# Patient Record
Sex: FEMALE | Race: WHITE | NOT HISPANIC OR LATINO | Employment: OTHER | URBAN - METROPOLITAN AREA
[De-identification: names, ages, dates, MRNs, and addresses within clinical notes are randomized per-mention and may not be internally consistent; named-entity substitution may affect disease eponyms.]

---

## 2023-01-01 ENCOUNTER — APPOINTMENT (OUTPATIENT)
Dept: GENERAL RADIOLOGY | Facility: CLINIC | Age: 63
DRG: 948 | End: 2023-01-01
Attending: EMERGENCY MEDICINE
Payer: COMMERCIAL

## 2023-01-01 ENCOUNTER — APPOINTMENT (OUTPATIENT)
Dept: ULTRASOUND IMAGING | Facility: CLINIC | Age: 63
DRG: 948 | End: 2023-01-01
Attending: INTERNAL MEDICINE
Payer: COMMERCIAL

## 2023-01-01 ENCOUNTER — MEDICAL CORRESPONDENCE (OUTPATIENT)
Dept: HEALTH INFORMATION MANAGEMENT | Facility: CLINIC | Age: 63
End: 2023-01-01

## 2023-01-01 ENCOUNTER — APPOINTMENT (OUTPATIENT)
Dept: CT IMAGING | Facility: CLINIC | Age: 63
DRG: 948 | End: 2023-01-01
Attending: EMERGENCY MEDICINE
Payer: COMMERCIAL

## 2023-01-01 ENCOUNTER — APPOINTMENT (OUTPATIENT)
Dept: INTERVENTIONAL RADIOLOGY/VASCULAR | Facility: CLINIC | Age: 63
DRG: 948 | End: 2023-01-01
Attending: PHYSICIAN ASSISTANT
Payer: COMMERCIAL

## 2023-01-01 ENCOUNTER — APPOINTMENT (OUTPATIENT)
Dept: CT IMAGING | Facility: CLINIC | Age: 63
DRG: 948 | End: 2023-01-01
Attending: HOSPITALIST
Payer: COMMERCIAL

## 2023-01-01 ENCOUNTER — APPOINTMENT (OUTPATIENT)
Dept: CT IMAGING | Facility: CLINIC | Age: 63
End: 2023-01-01
Attending: EMERGENCY MEDICINE
Payer: COMMERCIAL

## 2023-01-01 ENCOUNTER — HOSPITAL ENCOUNTER (EMERGENCY)
Facility: CLINIC | Age: 63
Discharge: HOME | End: 2023-08-08
Attending: EMERGENCY MEDICINE | Admitting: EMERGENCY MEDICINE
Payer: COMMERCIAL

## 2023-01-01 ENCOUNTER — APPOINTMENT (OUTPATIENT)
Dept: GENERAL RADIOLOGY | Facility: CLINIC | Age: 63
End: 2023-01-01
Attending: EMERGENCY MEDICINE
Payer: COMMERCIAL

## 2023-01-01 ENCOUNTER — HOSPITAL ENCOUNTER (INPATIENT)
Facility: CLINIC | Age: 63
LOS: 7 days | Discharge: HOSPICE | DRG: 948 | End: 2023-09-29
Attending: EMERGENCY MEDICINE | Admitting: INTERNAL MEDICINE
Payer: COMMERCIAL

## 2023-01-01 ENCOUNTER — TRANSFERRED RECORDS (OUTPATIENT)
Dept: HEALTH INFORMATION MANAGEMENT | Facility: CLINIC | Age: 63
End: 2023-01-01
Payer: COMMERCIAL

## 2023-01-01 ENCOUNTER — APPOINTMENT (OUTPATIENT)
Dept: GENERAL RADIOLOGY | Facility: CLINIC | Age: 63
DRG: 948 | End: 2023-01-01
Attending: INTERNAL MEDICINE
Payer: COMMERCIAL

## 2023-01-01 VITALS
HEART RATE: 67 BPM | TEMPERATURE: 97.8 F | OXYGEN SATURATION: 96 % | WEIGHT: 200 LBS | HEIGHT: 65 IN | RESPIRATION RATE: 16 BRPM | BODY MASS INDEX: 33.32 KG/M2 | DIASTOLIC BLOOD PRESSURE: 86 MMHG | SYSTOLIC BLOOD PRESSURE: 170 MMHG

## 2023-01-01 VITALS
OXYGEN SATURATION: 95 % | HEIGHT: 66 IN | HEART RATE: 79 BPM | DIASTOLIC BLOOD PRESSURE: 79 MMHG | RESPIRATION RATE: 16 BRPM | BODY MASS INDEX: 36.42 KG/M2 | SYSTOLIC BLOOD PRESSURE: 154 MMHG | TEMPERATURE: 98.4 F | WEIGHT: 226.6 LBS

## 2023-01-01 DIAGNOSIS — S22.32XA CLOSED FRACTURE OF ONE RIB OF LEFT SIDE, INITIAL ENCOUNTER: ICD-10-CM

## 2023-01-01 DIAGNOSIS — R11.0 NAUSEA: ICD-10-CM

## 2023-01-01 DIAGNOSIS — K59.00 CONSTIPATION, UNSPECIFIED CONSTIPATION TYPE: ICD-10-CM

## 2023-01-01 DIAGNOSIS — Z79.1 NSAID LONG-TERM USE: ICD-10-CM

## 2023-01-01 DIAGNOSIS — G89.3 CANCER ASSOCIATED PAIN: Primary | ICD-10-CM

## 2023-01-01 DIAGNOSIS — R05.9 COUGH, UNSPECIFIED TYPE: ICD-10-CM

## 2023-01-01 DIAGNOSIS — M89.9 LYTIC LESION OF BONE ON X-RAY: ICD-10-CM

## 2023-01-01 DIAGNOSIS — R52 INTRACTABLE PAIN: ICD-10-CM

## 2023-01-01 DIAGNOSIS — J90 PLEURAL EFFUSION, LEFT: ICD-10-CM

## 2023-01-01 DIAGNOSIS — F19.20 DEPENDENCY ON PAIN MEDICATION (H): ICD-10-CM

## 2023-01-01 DIAGNOSIS — R22.2 CHEST WALL MASS: ICD-10-CM

## 2023-01-01 LAB
ALBUMIN SERPL BCG-MCNC: 4.2 G/DL (ref 3.5–5.2)
ALP SERPL-CCNC: 132 U/L (ref 35–104)
ALT SERPL W P-5'-P-CCNC: 11 U/L (ref 0–50)
ANION GAP SERPL CALCULATED.3IONS-SCNC: 11 MMOL/L (ref 7–15)
ANION GAP SERPL CALCULATED.3IONS-SCNC: 11 MMOL/L (ref 7–15)
ANION GAP SERPL CALCULATED.3IONS-SCNC: 14 MMOL/L (ref 7–15)
ANION GAP SERPL CALCULATED.3IONS-SCNC: 15 MMOL/L (ref 7–15)
ANION GAP SERPL CALCULATED.3IONS-SCNC: 8 MMOL/L (ref 7–15)
AST SERPL W P-5'-P-CCNC: 15 U/L (ref 0–45)
ATRIAL RATE - MUSE: 74 BPM
ATRIAL RATE - MUSE: 90 BPM
BACTERIA BLD CULT: NO GROWTH
BACTERIA BLD CULT: NO GROWTH
BACTERIA PLR CULT: NO GROWTH
BASOPHILS # BLD AUTO: 0.1 10E3/UL (ref 0–0.2)
BASOPHILS # BLD AUTO: 0.1 10E3/UL (ref 0–0.2)
BASOPHILS NFR BLD AUTO: 0 %
BASOPHILS NFR BLD AUTO: 1 %
BILIRUB SERPL-MCNC: 0.3 MG/DL
BUN SERPL-MCNC: 12.2 MG/DL (ref 8–23)
BUN SERPL-MCNC: 14.2 MG/DL (ref 8–23)
BUN SERPL-MCNC: 14.6 MG/DL (ref 8–23)
BUN SERPL-MCNC: 16.1 MG/DL (ref 8–23)
BUN SERPL-MCNC: 17.2 MG/DL (ref 8–23)
CALCIUM SERPL-MCNC: 9.2 MG/DL (ref 8.8–10.2)
CALCIUM SERPL-MCNC: 9.3 MG/DL (ref 8.8–10.2)
CALCIUM SERPL-MCNC: 9.5 MG/DL (ref 8.8–10.2)
CALCIUM SERPL-MCNC: 9.5 MG/DL (ref 8.8–10.2)
CALCIUM SERPL-MCNC: 9.9 MG/DL (ref 8.8–10.2)
CHLORIDE SERPL-SCNC: 100 MMOL/L (ref 98–107)
CHLORIDE SERPL-SCNC: 96 MMOL/L (ref 98–107)
CHLORIDE SERPL-SCNC: 97 MMOL/L (ref 98–107)
CHLORIDE SERPL-SCNC: 99 MMOL/L (ref 98–107)
CHLORIDE SERPL-SCNC: 99 MMOL/L (ref 98–107)
CREAT SERPL-MCNC: 0.62 MG/DL (ref 0.51–0.95)
CREAT SERPL-MCNC: 0.65 MG/DL (ref 0.51–0.95)
CREAT SERPL-MCNC: 0.66 MG/DL (ref 0.51–0.95)
CREAT SERPL-MCNC: 0.69 MG/DL (ref 0.51–0.95)
CREAT SERPL-MCNC: 0.71 MG/DL (ref 0.51–0.95)
CREAT SERPL-MCNC: 0.77 MG/DL (ref 0.51–0.95)
CREAT SERPL-MCNC: 0.92 MG/DL (ref 0.51–0.95)
D DIMER PPP FEU-MCNC: 1.66 UG/ML FEU (ref 0–0.5)
DEPRECATED HCO3 PLAS-SCNC: 24 MMOL/L (ref 22–29)
DEPRECATED HCO3 PLAS-SCNC: 28 MMOL/L (ref 22–29)
DEPRECATED HCO3 PLAS-SCNC: 29 MMOL/L (ref 22–29)
DEPRECATED HCO3 PLAS-SCNC: 30 MMOL/L (ref 22–29)
DEPRECATED HCO3 PLAS-SCNC: 31 MMOL/L (ref 22–29)
DIASTOLIC BLOOD PRESSURE - MUSE: NORMAL MMHG
DIASTOLIC BLOOD PRESSURE - MUSE: NORMAL MMHG
EGFRCR SERPLBLD CKD-EPI 2021: 85 ML/MIN/1.73M2
EGFRCR SERPLBLD CKD-EPI 2021: >90 ML/MIN/1.73M2
EOSINOPHIL # BLD AUTO: 0 10E3/UL (ref 0–0.7)
EOSINOPHIL # BLD AUTO: 0.3 10E3/UL (ref 0–0.7)
EOSINOPHIL NFR BLD AUTO: 0 %
EOSINOPHIL NFR BLD AUTO: 2 %
ERYTHROCYTE [DISTWIDTH] IN BLOOD BY AUTOMATED COUNT: 13.2 % (ref 10–15)
ERYTHROCYTE [DISTWIDTH] IN BLOOD BY AUTOMATED COUNT: 13.3 % (ref 10–15)
ERYTHROCYTE [DISTWIDTH] IN BLOOD BY AUTOMATED COUNT: 13.7 % (ref 10–15)
GFR SERPL CREATININE-BSD FRML MDRD: 69 ML/MIN/1.73M2
GLUCOSE BODY FLUID SOURCE: NORMAL
GLUCOSE FLD-MCNC: 121 MG/DL
GLUCOSE SERPL-MCNC: 105 MG/DL (ref 70–99)
GLUCOSE SERPL-MCNC: 112 MG/DL (ref 70–99)
GLUCOSE SERPL-MCNC: 116 MG/DL (ref 70–99)
GLUCOSE SERPL-MCNC: 117 MG/DL (ref 70–99)
GLUCOSE SERPL-MCNC: 143 MG/DL (ref 70–99)
GRAM STAIN RESULT: NORMAL
GRAM STAIN RESULT: NORMAL
HCT VFR BLD AUTO: 36.8 % (ref 35–47)
HCT VFR BLD AUTO: 38.2 % (ref 35–47)
HCT VFR BLD AUTO: 39.5 % (ref 35–47)
HCT VFR BLD AUTO: 43 % (ref 35–47)
HCT VFR BLD AUTO: 44.4 % (ref 35–47)
HGB BLD-MCNC: 11.5 G/DL (ref 11.7–15.7)
HGB BLD-MCNC: 11.8 G/DL (ref 11.7–15.7)
HGB BLD-MCNC: 12.1 G/DL (ref 11.7–15.7)
HGB BLD-MCNC: 13.7 G/DL (ref 11.7–15.7)
HGB BLD-MCNC: 14.5 G/DL (ref 11.7–15.7)
HOLD SPECIMEN: NORMAL
HOLD SPECIMEN: NORMAL
IMM GRANULOCYTES # BLD: 0.1 10E3/UL
IMM GRANULOCYTES # BLD: 0.1 10E3/UL
IMM GRANULOCYTES NFR BLD: 0 %
IMM GRANULOCYTES NFR BLD: 1 %
INTERPRETATION ECG - MUSE: NORMAL
INTERPRETATION ECG - MUSE: NORMAL
LD BODY BODY FLUID SOURCE: NORMAL
LDH FLD L TO P-CCNC: 222 U/L
LDH SERPL L TO P-CCNC: 220 U/L (ref 0–250)
LYMPHOCYTES # BLD AUTO: 2.5 10E3/UL (ref 0.8–5.3)
LYMPHOCYTES # BLD AUTO: 3.7 10E3/UL (ref 0.8–5.3)
LYMPHOCYTES NFR BLD AUTO: 13 %
LYMPHOCYTES NFR BLD AUTO: 26 %
MCH RBC QN AUTO: 28.5 PG (ref 26.5–33)
MCH RBC QN AUTO: 28.5 PG (ref 26.5–33)
MCH RBC QN AUTO: 28.8 PG (ref 26.5–33)
MCH RBC QN AUTO: 28.9 PG (ref 26.5–33)
MCH RBC QN AUTO: 30 PG (ref 26.5–33)
MCHC RBC AUTO-ENTMCNC: 30.6 G/DL (ref 31.5–36.5)
MCHC RBC AUTO-ENTMCNC: 30.9 G/DL (ref 31.5–36.5)
MCHC RBC AUTO-ENTMCNC: 31.3 G/DL (ref 31.5–36.5)
MCHC RBC AUTO-ENTMCNC: 31.9 G/DL (ref 31.5–36.5)
MCHC RBC AUTO-ENTMCNC: 32.7 G/DL (ref 31.5–36.5)
MCV RBC AUTO: 91 FL (ref 78–100)
MCV RBC AUTO: 92 FL (ref 78–100)
MCV RBC AUTO: 93 FL (ref 78–100)
MONOCYTES # BLD AUTO: 1 10E3/UL (ref 0–1.3)
MONOCYTES # BLD AUTO: 1.2 10E3/UL (ref 0–1.3)
MONOCYTES NFR BLD AUTO: 6 %
MONOCYTES NFR BLD AUTO: 7 %
NEUTROPHILS # BLD AUTO: 15.4 10E3/UL (ref 1.6–8.3)
NEUTROPHILS # BLD AUTO: 9 10E3/UL (ref 1.6–8.3)
NEUTROPHILS NFR BLD AUTO: 64 %
NEUTROPHILS NFR BLD AUTO: 80 %
NRBC # BLD AUTO: 0 10E3/UL
NRBC # BLD AUTO: 0 10E3/UL
NRBC BLD AUTO-RTO: 0 /100
NRBC BLD AUTO-RTO: 0 /100
P AXIS - MUSE: 53 DEGREES
P AXIS - MUSE: 56 DEGREES
PATH REPORT.COMMENTS IMP SPEC: NORMAL
PATH REPORT.FINAL DX SPEC: NORMAL
PATH REPORT.GROSS SPEC: NORMAL
PATH REPORT.MICROSCOPIC SPEC OTHER STN: NORMAL
PATH REPORT.RELEVANT HX SPEC: NORMAL
PLATELET # BLD AUTO: 518 10E3/UL (ref 150–450)
PLATELET # BLD AUTO: 603 10E3/UL (ref 150–450)
PLATELET # BLD AUTO: 633 10E3/UL (ref 150–450)
PLATELET # BLD AUTO: 642 10E3/UL (ref 150–450)
PLATELET # BLD AUTO: 658 10E3/UL (ref 150–450)
PLATELET # BLD AUTO: 708 10E3/UL (ref 150–450)
POTASSIUM SERPL-SCNC: 3.5 MMOL/L (ref 3.4–5.3)
POTASSIUM SERPL-SCNC: 3.7 MMOL/L (ref 3.4–5.3)
POTASSIUM SERPL-SCNC: 3.9 MMOL/L (ref 3.4–5.3)
POTASSIUM SERPL-SCNC: 4.4 MMOL/L (ref 3.4–5.3)
POTASSIUM SERPL-SCNC: 4.5 MMOL/L (ref 3.4–5.3)
PR INTERVAL - MUSE: 112 MS
PR INTERVAL - MUSE: 116 MS
PROT FLD-MCNC: 4.2 G/DL
PROT SERPL-MCNC: 7.7 G/DL (ref 6.4–8.3)
PROT SERPL-MCNC: 8.1 G/DL (ref 6.4–8.3)
PROTEIN BODY FLUID SOURCE: NORMAL
QRS DURATION - MUSE: 66 MS
QRS DURATION - MUSE: 70 MS
QT - MUSE: 372 MS
QT - MUSE: 384 MS
QTC - MUSE: 412 MS
QTC - MUSE: 469 MS
R AXIS - MUSE: 19 DEGREES
R AXIS - MUSE: 39 DEGREES
RBC # BLD AUTO: 4 10E6/UL (ref 3.8–5.2)
RBC # BLD AUTO: 4.14 10E6/UL (ref 3.8–5.2)
RBC # BLD AUTO: 4.25 10E6/UL (ref 3.8–5.2)
RBC # BLD AUTO: 4.74 10E6/UL (ref 3.8–5.2)
RBC # BLD AUTO: 4.83 10E6/UL (ref 3.8–5.2)
SODIUM SERPL-SCNC: 137 MMOL/L (ref 136–145)
SODIUM SERPL-SCNC: 138 MMOL/L (ref 136–145)
SODIUM SERPL-SCNC: 138 MMOL/L (ref 136–145)
SODIUM SERPL-SCNC: 139 MMOL/L (ref 136–145)
SODIUM SERPL-SCNC: 140 MMOL/L (ref 136–145)
SYSTOLIC BLOOD PRESSURE - MUSE: NORMAL MMHG
SYSTOLIC BLOOD PRESSURE - MUSE: NORMAL MMHG
T AXIS - MUSE: 37 DEGREES
T AXIS - MUSE: 41 DEGREES
TROPONIN T SERPL HS-MCNC: 8 NG/L
VENTRICULAR RATE- MUSE: 74 BPM
VENTRICULAR RATE- MUSE: 90 BPM
WBC # BLD AUTO: 14 10E3/UL (ref 4–11)
WBC # BLD AUTO: 15.2 10E3/UL (ref 4–11)
WBC # BLD AUTO: 18.3 10E3/UL (ref 4–11)
WBC # BLD AUTO: 18.3 10E3/UL (ref 4–11)
WBC # BLD AUTO: 19.4 10E3/UL (ref 4–11)

## 2023-01-01 PROCEDURE — 99232 SBSQ HOSP IP/OBS MODERATE 35: CPT | Performed by: INTERNAL MEDICINE

## 2023-01-01 PROCEDURE — 36415 COLL VENOUS BLD VENIPUNCTURE: CPT | Performed by: STUDENT IN AN ORGANIZED HEALTH CARE EDUCATION/TRAINING PROGRAM

## 2023-01-01 PROCEDURE — 250N000013 HC RX MED GY IP 250 OP 250 PS 637: Performed by: CLINICAL NURSE SPECIALIST

## 2023-01-01 PROCEDURE — 36415 COLL VENOUS BLD VENIPUNCTURE: CPT | Performed by: INTERNAL MEDICINE

## 2023-01-01 PROCEDURE — 80048 BASIC METABOLIC PNL TOTAL CA: CPT | Performed by: HOSPITALIST

## 2023-01-01 PROCEDURE — 250N000013 HC RX MED GY IP 250 OP 250 PS 637: Performed by: INTERNAL MEDICINE

## 2023-01-01 PROCEDURE — 250N000011 HC RX IP 250 OP 636: Performed by: EMERGENCY MEDICINE

## 2023-01-01 PROCEDURE — 250N000009 HC RX 250: Performed by: EMERGENCY MEDICINE

## 2023-01-01 PROCEDURE — 71275 CT ANGIOGRAPHY CHEST: CPT

## 2023-01-01 PROCEDURE — 82565 ASSAY OF CREATININE: CPT | Performed by: INTERNAL MEDICINE

## 2023-01-01 PROCEDURE — 120N000001 HC R&B MED SURG/OB

## 2023-01-01 PROCEDURE — 84484 ASSAY OF TROPONIN QUANT: CPT | Performed by: EMERGENCY MEDICINE

## 2023-01-01 PROCEDURE — 99239 HOSP IP/OBS DSCHRG MGMT >30: CPT | Mod: GV | Performed by: INTERNAL MEDICINE

## 2023-01-01 PROCEDURE — 250N000011 HC RX IP 250 OP 636: Performed by: PHYSICIAN ASSISTANT

## 2023-01-01 PROCEDURE — 96376 TX/PRO/DX INJ SAME DRUG ADON: CPT

## 2023-01-01 PROCEDURE — 99285 EMERGENCY DEPT VISIT HI MDM: CPT | Mod: 25

## 2023-01-01 PROCEDURE — 77412 RADIATION TX DELIVERY LVL 3: CPT

## 2023-01-01 PROCEDURE — 250N000011 HC RX IP 250 OP 636: Performed by: INTERNAL MEDICINE

## 2023-01-01 PROCEDURE — 80053 COMPREHEN METABOLIC PANEL: CPT | Performed by: STUDENT IN AN ORGANIZED HEALTH CARE EDUCATION/TRAINING PROGRAM

## 2023-01-01 PROCEDURE — 250N000013 HC RX MED GY IP 250 OP 250 PS 637: Performed by: NURSE PRACTITIONER

## 2023-01-01 PROCEDURE — 250N000011 HC RX IP 250 OP 636: Performed by: CLINICAL NURSE SPECIALIST

## 2023-01-01 PROCEDURE — 93005 ELECTROCARDIOGRAM TRACING: CPT

## 2023-01-01 PROCEDURE — 88305 TISSUE EXAM BY PATHOLOGIST: CPT | Mod: 26 | Performed by: PATHOLOGY

## 2023-01-01 PROCEDURE — 80053 COMPREHEN METABOLIC PANEL: CPT | Performed by: EMERGENCY MEDICINE

## 2023-01-01 PROCEDURE — 272N000500 HC NEEDLE CR2

## 2023-01-01 PROCEDURE — 99232 SBSQ HOSP IP/OBS MODERATE 35: CPT | Performed by: NURSE PRACTITIONER

## 2023-01-01 PROCEDURE — 85025 COMPLETE CBC W/AUTO DIFF WBC: CPT | Performed by: EMERGENCY MEDICINE

## 2023-01-01 PROCEDURE — 88342 IMHCHEM/IMCYTCHM 1ST ANTB: CPT | Mod: 26 | Performed by: PATHOLOGY

## 2023-01-01 PROCEDURE — 87070 CULTURE OTHR SPECIMN AEROBIC: CPT | Performed by: INTERNAL MEDICINE

## 2023-01-01 PROCEDURE — 99223 1ST HOSP IP/OBS HIGH 75: CPT | Performed by: CLINICAL NURSE SPECIALIST

## 2023-01-01 PROCEDURE — 250N000013 HC RX MED GY IP 250 OP 250 PS 637: Performed by: HOSPITALIST

## 2023-01-01 PROCEDURE — 85018 HEMOGLOBIN: CPT | Performed by: HOSPITALIST

## 2023-01-01 PROCEDURE — 99152 MOD SED SAME PHYS/QHP 5/>YRS: CPT

## 2023-01-01 PROCEDURE — 0W9B3ZZ DRAINAGE OF LEFT PLEURAL CAVITY, PERCUTANEOUS APPROACH: ICD-10-PCS | Performed by: RADIOLOGY

## 2023-01-01 PROCEDURE — 85379 FIBRIN DEGRADATION QUANT: CPT | Performed by: HOSPITALIST

## 2023-01-01 PROCEDURE — 85025 COMPLETE CBC W/AUTO DIFF WBC: CPT | Performed by: STUDENT IN AN ORGANIZED HEALTH CARE EDUCATION/TRAINING PROGRAM

## 2023-01-01 PROCEDURE — 80048 BASIC METABOLIC PNL TOTAL CA: CPT | Performed by: EMERGENCY MEDICINE

## 2023-01-01 PROCEDURE — 88112 CYTOPATH CELL ENHANCE TECH: CPT | Mod: 26 | Performed by: PATHOLOGY

## 2023-01-01 PROCEDURE — C1729 CATH, DRAINAGE: HCPCS

## 2023-01-01 PROCEDURE — 73030 X-RAY EXAM OF SHOULDER: CPT | Mod: LT

## 2023-01-01 PROCEDURE — 88341 IMHCHEM/IMCYTCHM EA ADD ANTB: CPT | Mod: TC | Performed by: INTERNAL MEDICINE

## 2023-01-01 PROCEDURE — 99232 SBSQ HOSP IP/OBS MODERATE 35: CPT | Mod: GW | Performed by: INTERNAL MEDICINE

## 2023-01-01 PROCEDURE — 77300 RADIATION THERAPY DOSE PLAN: CPT

## 2023-01-01 PROCEDURE — 96374 THER/PROPH/DIAG INJ IV PUSH: CPT | Mod: 59

## 2023-01-01 PROCEDURE — 77387 GUIDANCE FOR RADJ TX DLVR: CPT

## 2023-01-01 PROCEDURE — 71046 X-RAY EXAM CHEST 2 VIEWS: CPT

## 2023-01-01 PROCEDURE — 84155 ASSAY OF PROTEIN SERUM: CPT | Performed by: INTERNAL MEDICINE

## 2023-01-01 PROCEDURE — 84157 ASSAY OF PROTEIN OTHER: CPT | Performed by: INTERNAL MEDICINE

## 2023-01-01 PROCEDURE — 99221 1ST HOSP IP/OBS SF/LOW 40: CPT | Performed by: INTERNAL MEDICINE

## 2023-01-01 PROCEDURE — 77280 THER RAD SIMULAJ FIELD SMPL: CPT

## 2023-01-01 PROCEDURE — 99233 SBSQ HOSP IP/OBS HIGH 50: CPT | Performed by: NURSE PRACTITIONER

## 2023-01-01 PROCEDURE — 87040 BLOOD CULTURE FOR BACTERIA: CPT | Performed by: EMERGENCY MEDICINE

## 2023-01-01 PROCEDURE — 99232 SBSQ HOSP IP/OBS MODERATE 35: CPT | Performed by: HOSPITALIST

## 2023-01-01 PROCEDURE — 80048 BASIC METABOLIC PNL TOTAL CA: CPT | Performed by: INTERNAL MEDICINE

## 2023-01-01 PROCEDURE — 84484 ASSAY OF TROPONIN QUANT: CPT | Performed by: STUDENT IN AN ORGANIZED HEALTH CARE EDUCATION/TRAINING PROGRAM

## 2023-01-01 PROCEDURE — 250N000009 HC RX 250: Performed by: INTERNAL MEDICINE

## 2023-01-01 PROCEDURE — 96375 TX/PRO/DX INJ NEW DRUG ADDON: CPT

## 2023-01-01 PROCEDURE — 250N000013 HC RX MED GY IP 250 OP 250 PS 637: Performed by: EMERGENCY MEDICINE

## 2023-01-01 PROCEDURE — 88341 IMHCHEM/IMCYTCHM EA ADD ANTB: CPT | Mod: 26 | Performed by: PATHOLOGY

## 2023-01-01 PROCEDURE — 99233 SBSQ HOSP IP/OBS HIGH 50: CPT | Performed by: PHYSICIAN ASSISTANT

## 2023-01-01 PROCEDURE — 99233 SBSQ HOSP IP/OBS HIGH 50: CPT | Performed by: INTERNAL MEDICINE

## 2023-01-01 PROCEDURE — 250N000009 HC RX 250: Performed by: PHYSICIAN ASSISTANT

## 2023-01-01 PROCEDURE — 99222 1ST HOSP IP/OBS MODERATE 55: CPT | Performed by: INTERNAL MEDICINE

## 2023-01-01 PROCEDURE — 85027 COMPLETE CBC AUTOMATED: CPT | Performed by: HOSPITALIST

## 2023-01-01 PROCEDURE — 73502 X-RAY EXAM HIP UNI 2-3 VIEWS: CPT

## 2023-01-01 PROCEDURE — 0W9B30Z DRAINAGE OF LEFT PLEURAL CAVITY WITH DRAINAGE DEVICE, PERCUTANEOUS APPROACH: ICD-10-PCS | Performed by: RADIOLOGY

## 2023-01-01 PROCEDURE — 77295 3-D RADIOTHERAPY PLAN: CPT

## 2023-01-01 PROCEDURE — 36415 COLL VENOUS BLD VENIPUNCTURE: CPT | Performed by: HOSPITALIST

## 2023-01-01 PROCEDURE — 75989 ABSCESS DRAINAGE UNDER X-RAY: CPT

## 2023-01-01 PROCEDURE — 250N000009 HC RX 250: Performed by: RADIOLOGY

## 2023-01-01 PROCEDURE — 77334 RADIATION TREATMENT AID(S): CPT

## 2023-01-01 PROCEDURE — 272N000706 US THORACENTESIS

## 2023-01-01 PROCEDURE — 250N000011 HC RX IP 250 OP 636: Mod: JZ | Performed by: INTERNAL MEDICINE

## 2023-01-01 PROCEDURE — 83615 LACTATE (LD) (LDH) ENZYME: CPT | Performed by: INTERNAL MEDICINE

## 2023-01-01 PROCEDURE — 85049 AUTOMATED PLATELET COUNT: CPT | Performed by: INTERNAL MEDICINE

## 2023-01-01 PROCEDURE — 82945 GLUCOSE OTHER FLUID: CPT | Performed by: INTERNAL MEDICINE

## 2023-01-01 PROCEDURE — 82310 ASSAY OF CALCIUM: CPT | Performed by: HOSPITALIST

## 2023-01-01 PROCEDURE — 85027 COMPLETE CBC AUTOMATED: CPT | Performed by: INTERNAL MEDICINE

## 2023-01-01 PROCEDURE — 88342 IMHCHEM/IMCYTCHM 1ST ANTB: CPT | Mod: TC | Performed by: INTERNAL MEDICINE

## 2023-01-01 PROCEDURE — 36415 COLL VENOUS BLD VENIPUNCTURE: CPT | Performed by: EMERGENCY MEDICINE

## 2023-01-01 PROCEDURE — 88112 CYTOPATH CELL ENHANCE TECH: CPT | Mod: TC | Performed by: INTERNAL MEDICINE

## 2023-01-01 RX ORDER — OXYCODONE HYDROCHLORIDE 5 MG/1
5 TABLET ORAL ONCE
Status: COMPLETED | OUTPATIENT
Start: 2023-01-01 | End: 2023-01-01

## 2023-01-01 RX ORDER — LIDOCAINE 4 G/G
2 PATCH TOPICAL
Status: DISCONTINUED | OUTPATIENT
Start: 2023-01-01 | End: 2023-01-01 | Stop reason: HOSPADM

## 2023-01-01 RX ORDER — CYCLOBENZAPRINE HCL 10 MG
10 TABLET ORAL ONCE
Status: COMPLETED | OUTPATIENT
Start: 2023-01-01 | End: 2023-01-01

## 2023-01-01 RX ORDER — HYDROMORPHONE HYDROCHLORIDE 1 MG/ML
0.5 INJECTION, SOLUTION INTRAMUSCULAR; INTRAVENOUS; SUBCUTANEOUS EVERY 30 MIN PRN
Status: COMPLETED | OUTPATIENT
Start: 2023-01-01 | End: 2023-01-01

## 2023-01-01 RX ORDER — ACETAMINOPHEN 500 MG
1000 TABLET ORAL EVERY 8 HOURS
Qty: 50 TABLET | Refills: 0 | Status: SHIPPED | OUTPATIENT
Start: 2023-01-01 | End: 2023-01-01

## 2023-01-01 RX ORDER — AMOXICILLIN 250 MG
2 CAPSULE ORAL 2 TIMES DAILY PRN
Status: DISCONTINUED | OUTPATIENT
Start: 2023-01-01 | End: 2023-01-01

## 2023-01-01 RX ORDER — HYDROMORPHONE HYDROCHLORIDE 4 MG/1
4-6 TABLET ORAL EVERY 6 HOURS PRN
Qty: 20 TABLET | Refills: 0 | Status: SHIPPED | OUTPATIENT
Start: 2023-01-01

## 2023-01-01 RX ORDER — HYDROMORPHONE HYDROCHLORIDE 2 MG/1
4-6 TABLET ORAL EVERY 4 HOURS PRN
Status: DISCONTINUED | OUTPATIENT
Start: 2023-01-01 | End: 2023-01-01 | Stop reason: HOSPADM

## 2023-01-01 RX ORDER — IBUPROFEN 200 MG
400 TABLET ORAL 3 TIMES DAILY PRN
Status: ON HOLD | COMMUNITY
End: 2023-01-01

## 2023-01-01 RX ORDER — MORPHINE SULFATE 15 MG/1
15 TABLET, FILM COATED, EXTENDED RELEASE ORAL EVERY 12 HOURS SCHEDULED
Status: DISCONTINUED | OUTPATIENT
Start: 2023-01-01 | End: 2023-01-01 | Stop reason: HOSPADM

## 2023-01-01 RX ORDER — IOPAMIDOL 755 MG/ML
73 INJECTION, SOLUTION INTRAVASCULAR ONCE
Status: COMPLETED | OUTPATIENT
Start: 2023-01-01 | End: 2023-01-01

## 2023-01-01 RX ORDER — NALOXONE HYDROCHLORIDE 0.4 MG/ML
0.2 INJECTION, SOLUTION INTRAMUSCULAR; INTRAVENOUS; SUBCUTANEOUS
Status: DISCONTINUED | OUTPATIENT
Start: 2023-01-01 | End: 2023-01-01

## 2023-01-01 RX ORDER — GUAIFENESIN 600 MG/1
600 TABLET, EXTENDED RELEASE ORAL 2 TIMES DAILY
Qty: 30 TABLET | Refills: 0 | Status: SHIPPED | OUTPATIENT
Start: 2023-01-01

## 2023-01-01 RX ORDER — ZOLPIDEM TARTRATE 10 MG/1
10 TABLET ORAL
Status: ON HOLD | COMMUNITY
End: 2023-01-01

## 2023-01-01 RX ORDER — ACETAMINOPHEN 325 MG/1
975 TABLET ORAL EVERY 8 HOURS
Status: DISCONTINUED | OUTPATIENT
Start: 2023-01-01 | End: 2023-01-01 | Stop reason: HOSPADM

## 2023-01-01 RX ORDER — POLYETHYLENE GLYCOL 3350 17 G/17G
17 POWDER, FOR SOLUTION ORAL 2 TIMES DAILY
Status: DISCONTINUED | OUTPATIENT
Start: 2023-01-01 | End: 2023-01-01

## 2023-01-01 RX ORDER — AZITHROMYCIN MONOHYDRATE 500 MG/5ML
500 INJECTION, POWDER, LYOPHILIZED, FOR SOLUTION INTRAVENOUS ONCE
Status: COMPLETED | OUTPATIENT
Start: 2023-01-01 | End: 2023-01-01

## 2023-01-01 RX ORDER — MORPHINE SULFATE 15 MG/1
15 TABLET, FILM COATED, EXTENDED RELEASE ORAL EVERY 12 HOURS
Qty: 10 TABLET | Refills: 0 | Status: SHIPPED | OUTPATIENT
Start: 2023-01-01

## 2023-01-01 RX ORDER — FLUMAZENIL 0.1 MG/ML
0.2 INJECTION, SOLUTION INTRAVENOUS
Status: ACTIVE | OUTPATIENT
Start: 2023-01-01 | End: 2023-01-01

## 2023-01-01 RX ORDER — POLYETHYLENE GLYCOL 3350 17 G/17G
17 POWDER, FOR SOLUTION ORAL DAILY
Qty: 30 EACH | Refills: 0 | Status: SHIPPED | OUTPATIENT
Start: 2023-01-01

## 2023-01-01 RX ORDER — IOPAMIDOL 755 MG/ML
77 INJECTION, SOLUTION INTRAVASCULAR ONCE
Status: COMPLETED | OUTPATIENT
Start: 2023-01-01 | End: 2023-01-01

## 2023-01-01 RX ORDER — OXYCODONE HYDROCHLORIDE 5 MG/1
5 TABLET ORAL EVERY 6 HOURS PRN
Qty: 12 TABLET | Refills: 0 | Status: SHIPPED | OUTPATIENT
Start: 2023-01-01 | End: 2023-01-01

## 2023-01-01 RX ORDER — AMOXICILLIN 250 MG
2 CAPSULE ORAL 2 TIMES DAILY
Qty: 60 TABLET | Refills: 0 | Status: SHIPPED | OUTPATIENT
Start: 2023-01-01

## 2023-01-01 RX ORDER — AMOXICILLIN 250 MG
1 CAPSULE ORAL 2 TIMES DAILY PRN
Status: DISCONTINUED | OUTPATIENT
Start: 2023-01-01 | End: 2023-01-01

## 2023-01-01 RX ORDER — HYDROMORPHONE HYDROCHLORIDE 1 MG/ML
.5-1 INJECTION, SOLUTION INTRAMUSCULAR; INTRAVENOUS; SUBCUTANEOUS
Status: DISCONTINUED | OUTPATIENT
Start: 2023-01-01 | End: 2023-01-01

## 2023-01-01 RX ORDER — GUAIFENESIN 600 MG/1
600 TABLET, EXTENDED RELEASE ORAL 2 TIMES DAILY
Status: DISCONTINUED | OUTPATIENT
Start: 2023-01-01 | End: 2023-01-01 | Stop reason: HOSPADM

## 2023-01-01 RX ORDER — OXYCODONE HYDROCHLORIDE 5 MG/1
15 TABLET ORAL EVERY 4 HOURS PRN
Status: DISCONTINUED | OUTPATIENT
Start: 2023-01-01 | End: 2023-01-01

## 2023-01-01 RX ORDER — HYDROMORPHONE HYDROCHLORIDE 2 MG/1
2-4 TABLET ORAL EVERY 4 HOURS PRN
Status: DISCONTINUED | OUTPATIENT
Start: 2023-01-01 | End: 2023-01-01

## 2023-01-01 RX ORDER — HYDROMORPHONE HYDROCHLORIDE 1 MG/ML
.5-1 INJECTION, SOLUTION INTRAMUSCULAR; INTRAVENOUS; SUBCUTANEOUS EVERY 6 HOURS PRN
Status: DISCONTINUED | OUTPATIENT
Start: 2023-01-01 | End: 2023-01-01 | Stop reason: HOSPADM

## 2023-01-01 RX ORDER — ONDANSETRON 2 MG/ML
4 INJECTION INTRAMUSCULAR; INTRAVENOUS EVERY 30 MIN PRN
Status: DISCONTINUED | OUTPATIENT
Start: 2023-01-01 | End: 2023-01-01

## 2023-01-01 RX ORDER — POLYETHYLENE GLYCOL 3350 17 G/17G
17 POWDER, FOR SOLUTION ORAL DAILY
Status: DISCONTINUED | OUTPATIENT
Start: 2023-01-01 | End: 2023-01-01

## 2023-01-01 RX ORDER — GUAIFENESIN/DEXTROMETHORPHAN 100-10MG/5
10 SYRUP ORAL EVERY 4 HOURS PRN
Status: DISCONTINUED | OUTPATIENT
Start: 2023-01-01 | End: 2023-01-01

## 2023-01-01 RX ORDER — NALOXONE HYDROCHLORIDE 0.4 MG/ML
0.2 INJECTION, SOLUTION INTRAMUSCULAR; INTRAVENOUS; SUBCUTANEOUS
Status: DISCONTINUED | OUTPATIENT
Start: 2023-01-01 | End: 2023-01-01 | Stop reason: HOSPADM

## 2023-01-01 RX ORDER — HYDROMORPHONE HYDROCHLORIDE 1 MG/ML
0.3 INJECTION, SOLUTION INTRAMUSCULAR; INTRAVENOUS; SUBCUTANEOUS
Status: DISCONTINUED | OUTPATIENT
Start: 2023-01-01 | End: 2023-01-01

## 2023-01-01 RX ORDER — SENNOSIDES 8.6 MG
1-2 TABLET ORAL 2 TIMES DAILY
Status: DISCONTINUED | OUTPATIENT
Start: 2023-01-01 | End: 2023-01-01

## 2023-01-01 RX ORDER — POLYETHYLENE GLYCOL 3350 17 G/17G
17 POWDER, FOR SOLUTION ORAL 2 TIMES DAILY
Status: DISCONTINUED | OUTPATIENT
Start: 2023-01-01 | End: 2023-01-01 | Stop reason: HOSPADM

## 2023-01-01 RX ORDER — ENOXAPARIN SODIUM 100 MG/ML
40 INJECTION SUBCUTANEOUS EVERY 24 HOURS
Status: DISCONTINUED | OUTPATIENT
Start: 2023-01-01 | End: 2023-01-01

## 2023-01-01 RX ORDER — NALOXONE HYDROCHLORIDE 0.4 MG/ML
0.4 INJECTION, SOLUTION INTRAMUSCULAR; INTRAVENOUS; SUBCUTANEOUS
Status: DISCONTINUED | OUTPATIENT
Start: 2023-01-01 | End: 2023-01-01 | Stop reason: HOSPADM

## 2023-01-01 RX ORDER — ONDANSETRON 4 MG/1
4 TABLET, ORALLY DISINTEGRATING ORAL EVERY 6 HOURS PRN
Qty: 20 TABLET | Refills: 0 | Status: SHIPPED | OUTPATIENT
Start: 2023-01-01

## 2023-01-01 RX ORDER — MORPHINE SULFATE 15 MG/1
15 TABLET, FILM COATED, EXTENDED RELEASE ORAL AT BEDTIME
Status: DISCONTINUED | OUTPATIENT
Start: 2023-01-01 | End: 2023-01-01

## 2023-01-01 RX ORDER — HYDROMORPHONE HYDROCHLORIDE 2 MG/1
2 TABLET ORAL EVERY 4 HOURS PRN
Status: DISCONTINUED | OUTPATIENT
Start: 2023-01-01 | End: 2023-01-01

## 2023-01-01 RX ORDER — PANTOPRAZOLE SODIUM 40 MG/1
40 TABLET, DELAYED RELEASE ORAL
Qty: 30 TABLET | Refills: 0 | Status: SHIPPED | OUTPATIENT
Start: 2023-01-01

## 2023-01-01 RX ORDER — HYDROMORPHONE HYDROCHLORIDE 1 MG/ML
0.5 INJECTION, SOLUTION INTRAMUSCULAR; INTRAVENOUS; SUBCUTANEOUS
Status: DISCONTINUED | OUTPATIENT
Start: 2023-01-01 | End: 2023-01-01

## 2023-01-01 RX ORDER — ONDANSETRON 4 MG/1
4 TABLET, ORALLY DISINTEGRATING ORAL EVERY 6 HOURS PRN
Status: DISCONTINUED | OUTPATIENT
Start: 2023-01-01 | End: 2023-01-01 | Stop reason: HOSPADM

## 2023-01-01 RX ORDER — NALOXONE HYDROCHLORIDE 0.4 MG/ML
0.4 INJECTION, SOLUTION INTRAMUSCULAR; INTRAVENOUS; SUBCUTANEOUS
Status: DISCONTINUED | OUTPATIENT
Start: 2023-01-01 | End: 2023-01-01

## 2023-01-01 RX ORDER — BENZONATATE 100 MG/1
100 CAPSULE ORAL 3 TIMES DAILY PRN
Status: DISCONTINUED | OUTPATIENT
Start: 2023-01-01 | End: 2023-01-01 | Stop reason: HOSPADM

## 2023-01-01 RX ORDER — IBUPROFEN 600 MG/1
600 TABLET, FILM COATED ORAL EVERY 8 HOURS PRN
Refills: 0
Start: 2023-01-01 | End: 2023-01-01

## 2023-01-01 RX ORDER — BISACODYL 10 MG
10 SUPPOSITORY, RECTAL RECTAL ONCE
Status: COMPLETED | OUTPATIENT
Start: 2023-01-01 | End: 2023-01-01

## 2023-01-01 RX ORDER — CODEINE PHOSPHATE AND GUAIFENESIN 10; 100 MG/5ML; MG/5ML
5 SOLUTION ORAL EVERY 4 HOURS PRN
Status: DISCONTINUED | OUTPATIENT
Start: 2023-01-01 | End: 2023-01-01

## 2023-01-01 RX ORDER — IBUPROFEN 400 MG/1
400 TABLET, FILM COATED ORAL 3 TIMES DAILY
Status: DISCONTINUED | OUTPATIENT
Start: 2023-01-01 | End: 2023-01-01

## 2023-01-01 RX ORDER — ONDANSETRON 2 MG/ML
4 INJECTION INTRAMUSCULAR; INTRAVENOUS EVERY 6 HOURS PRN
Status: DISCONTINUED | OUTPATIENT
Start: 2023-01-01 | End: 2023-01-01 | Stop reason: HOSPADM

## 2023-01-01 RX ORDER — CLINDAMYCIN PHOSPHATE 900 MG/50ML
900 INJECTION, SOLUTION INTRAVENOUS EVERY 8 HOURS
Status: DISCONTINUED | OUTPATIENT
Start: 2023-01-01 | End: 2023-01-01

## 2023-01-01 RX ORDER — IBUPROFEN 600 MG/1
600 TABLET, FILM COATED ORAL EVERY 8 HOURS PRN
Qty: 12 TABLET | Refills: 0 | Status: SHIPPED | OUTPATIENT
Start: 2023-01-01

## 2023-01-01 RX ORDER — AMOXICILLIN 250 MG
2 CAPSULE ORAL 2 TIMES DAILY
Status: DISCONTINUED | OUTPATIENT
Start: 2023-01-01 | End: 2023-01-01 | Stop reason: HOSPADM

## 2023-01-01 RX ORDER — FENTANYL CITRATE 0.05 MG/ML
25-50 INJECTION, SOLUTION INTRAMUSCULAR; INTRAVENOUS EVERY 5 MIN PRN
Status: DISCONTINUED | OUTPATIENT
Start: 2023-01-01 | End: 2023-01-01

## 2023-01-01 RX ORDER — LIDOCAINE 4 G/G
2 PATCH TOPICAL EVERY 24 HOURS
Qty: 14 PATCH | Refills: 0 | Status: SHIPPED | OUTPATIENT
Start: 2023-01-01

## 2023-01-01 RX ORDER — BENZONATATE 100 MG/1
100 CAPSULE ORAL 3 TIMES DAILY PRN
Qty: 15 CAPSULE | Refills: 0 | Status: SHIPPED | OUTPATIENT
Start: 2023-01-01

## 2023-01-01 RX ORDER — OXYCODONE HYDROCHLORIDE 10 MG/1
5 TABLET ORAL 3 TIMES DAILY
Status: ON HOLD | COMMUNITY
End: 2023-01-01

## 2023-01-01 RX ORDER — LIDOCAINE HYDROCHLORIDE 10 MG/ML
10 INJECTION, SOLUTION EPIDURAL; INFILTRATION; INTRACAUDAL; PERINEURAL ONCE
Status: COMPLETED | OUTPATIENT
Start: 2023-01-01 | End: 2023-01-01

## 2023-01-01 RX ORDER — CEFTRIAXONE 2 G/1
2 INJECTION, POWDER, FOR SOLUTION INTRAMUSCULAR; INTRAVENOUS ONCE
Status: COMPLETED | OUTPATIENT
Start: 2023-01-01 | End: 2023-01-01

## 2023-01-01 RX ORDER — OXYCODONE HYDROCHLORIDE 5 MG/1
10 TABLET ORAL EVERY 4 HOURS PRN
Status: DISCONTINUED | OUTPATIENT
Start: 2023-01-01 | End: 2023-01-01

## 2023-01-01 RX ORDER — IOPAMIDOL 755 MG/ML
76 INJECTION, SOLUTION INTRAVASCULAR ONCE
Status: COMPLETED | OUTPATIENT
Start: 2023-01-01 | End: 2023-01-01

## 2023-01-01 RX ORDER — CLINDAMYCIN PHOSPHATE 900 MG/50ML
900 INJECTION, SOLUTION INTRAVENOUS ONCE
Status: COMPLETED | OUTPATIENT
Start: 2023-01-01 | End: 2023-01-01

## 2023-01-01 RX ORDER — ACETAMINOPHEN 500 MG
1000 TABLET ORAL EVERY 8 HOURS PRN
Qty: 50 TABLET | Refills: 0 | Status: SHIPPED | OUTPATIENT
Start: 2023-01-01

## 2023-01-01 RX ORDER — PANTOPRAZOLE SODIUM 40 MG/1
40 TABLET, DELAYED RELEASE ORAL
Status: DISCONTINUED | OUTPATIENT
Start: 2023-01-01 | End: 2023-01-01 | Stop reason: HOSPADM

## 2023-01-01 RX ADMIN — HYDROMORPHONE HYDROCHLORIDE 6 MG: 2 TABLET ORAL at 14:54

## 2023-01-01 RX ADMIN — MORPHINE SULFATE 15 MG: 15 TABLET, EXTENDED RELEASE ORAL at 19:49

## 2023-01-01 RX ADMIN — BENZONATATE 100 MG: 100 CAPSULE ORAL at 13:13

## 2023-01-01 RX ADMIN — HYDROMORPHONE HYDROCHLORIDE 0.5 MG: 1 INJECTION, SOLUTION INTRAMUSCULAR; INTRAVENOUS; SUBCUTANEOUS at 09:29

## 2023-01-01 RX ADMIN — SODIUM CHLORIDE 100 ML: 9 INJECTION, SOLUTION INTRAVENOUS at 20:52

## 2023-01-01 RX ADMIN — HYDROMORPHONE HYDROCHLORIDE 0.5 MG: 1 INJECTION, SOLUTION INTRAMUSCULAR; INTRAVENOUS; SUBCUTANEOUS at 04:04

## 2023-01-01 RX ADMIN — OXYCODONE HYDROCHLORIDE 5 MG: 5 TABLET ORAL at 03:51

## 2023-01-01 RX ADMIN — ACETAMINOPHEN 975 MG: 325 TABLET, FILM COATED ORAL at 04:38

## 2023-01-01 RX ADMIN — SENNOSIDES AND DOCUSATE SODIUM 2 TABLET: 50; 8.6 TABLET ORAL at 07:32

## 2023-01-01 RX ADMIN — POLYETHYLENE GLYCOL 3350 17 G: 17 POWDER, FOR SOLUTION ORAL at 08:21

## 2023-01-01 RX ADMIN — MORPHINE SULFATE 15 MG: 15 TABLET, EXTENDED RELEASE ORAL at 09:23

## 2023-01-01 RX ADMIN — IBUPROFEN 400 MG: 400 TABLET ORAL at 17:01

## 2023-01-01 RX ADMIN — ACETAMINOPHEN 975 MG: 325 TABLET, FILM COATED ORAL at 11:47

## 2023-01-01 RX ADMIN — SENNOSIDES AND DOCUSATE SODIUM 2 TABLET: 50; 8.6 TABLET ORAL at 21:31

## 2023-01-01 RX ADMIN — HYDROMORPHONE HYDROCHLORIDE 0.5 MG: 1 INJECTION, SOLUTION INTRAMUSCULAR; INTRAVENOUS; SUBCUTANEOUS at 23:40

## 2023-01-01 RX ADMIN — SENNOSIDES AND DOCUSATE SODIUM 2 TABLET: 50; 8.6 TABLET ORAL at 10:53

## 2023-01-01 RX ADMIN — HYDROMORPHONE HYDROCHLORIDE 6 MG: 2 TABLET ORAL at 20:42

## 2023-01-01 RX ADMIN — MORPHINE SULFATE 15 MG: 15 TABLET, EXTENDED RELEASE ORAL at 19:24

## 2023-01-01 RX ADMIN — BENZONATATE 100 MG: 100 CAPSULE ORAL at 16:21

## 2023-01-01 RX ADMIN — ACETAMINOPHEN 975 MG: 325 TABLET, FILM COATED ORAL at 14:02

## 2023-01-01 RX ADMIN — BENZONATATE 100 MG: 100 CAPSULE ORAL at 09:38

## 2023-01-01 RX ADMIN — SENNOSIDES AND DOCUSATE SODIUM 2 TABLET: 50; 8.6 TABLET ORAL at 22:20

## 2023-01-01 RX ADMIN — HYDROMORPHONE HYDROCHLORIDE 1 MG: 1 INJECTION, SOLUTION INTRAMUSCULAR; INTRAVENOUS; SUBCUTANEOUS at 00:51

## 2023-01-01 RX ADMIN — LIDOCAINE 2 PATCH: 4 PATCH TOPICAL at 20:16

## 2023-01-01 RX ADMIN — HYDROMORPHONE HYDROCHLORIDE 4 MG: 2 TABLET ORAL at 09:21

## 2023-01-01 RX ADMIN — POLYETHYLENE GLYCOL 3350 17 G: 17 POWDER, FOR SOLUTION ORAL at 09:29

## 2023-01-01 RX ADMIN — BENZONATATE 100 MG: 100 CAPSULE ORAL at 17:39

## 2023-01-01 RX ADMIN — PANTOPRAZOLE SODIUM 40 MG: 40 TABLET, DELAYED RELEASE ORAL at 06:51

## 2023-01-01 RX ADMIN — HYDROMORPHONE HYDROCHLORIDE 2 MG: 2 TABLET ORAL at 14:02

## 2023-01-01 RX ADMIN — IOPAMIDOL 73 ML: 755 INJECTION, SOLUTION INTRAVENOUS at 02:19

## 2023-01-01 RX ADMIN — SODIUM CHLORIDE 98 ML: 9 INJECTION, SOLUTION INTRAVENOUS at 18:16

## 2023-01-01 RX ADMIN — HYDROMORPHONE HYDROCHLORIDE 4 MG: 2 TABLET ORAL at 11:47

## 2023-01-01 RX ADMIN — HYDROMORPHONE HYDROCHLORIDE 6 MG: 2 TABLET ORAL at 00:39

## 2023-01-01 RX ADMIN — SENNOSIDES AND DOCUSATE SODIUM 2 TABLET: 50; 8.6 TABLET ORAL at 21:16

## 2023-01-01 RX ADMIN — SENNOSIDES AND DOCUSATE SODIUM 2 TABLET: 50; 8.6 TABLET ORAL at 09:17

## 2023-01-01 RX ADMIN — ACETAMINOPHEN 975 MG: 325 TABLET, FILM COATED ORAL at 11:18

## 2023-01-01 RX ADMIN — POLYETHYLENE GLYCOL 3350 17 G: 17 POWDER, FOR SOLUTION ORAL at 08:17

## 2023-01-01 RX ADMIN — PANTOPRAZOLE SODIUM 40 MG: 40 TABLET, DELAYED RELEASE ORAL at 06:25

## 2023-01-01 RX ADMIN — BENZONATATE 100 MG: 100 CAPSULE ORAL at 05:05

## 2023-01-01 RX ADMIN — GUAIFENESIN 600 MG: 600 TABLET, EXTENDED RELEASE ORAL at 09:17

## 2023-01-01 RX ADMIN — HYDROMORPHONE HYDROCHLORIDE 0.3 MG: 1 INJECTION, SOLUTION INTRAMUSCULAR; INTRAVENOUS; SUBCUTANEOUS at 05:11

## 2023-01-01 RX ADMIN — SENNOSIDES 2 TABLET: 8.6 TABLET, FILM COATED ORAL at 11:47

## 2023-01-01 RX ADMIN — MORPHINE SULFATE 15 MG: 15 TABLET, EXTENDED RELEASE ORAL at 23:55

## 2023-01-01 RX ADMIN — IBUPROFEN 400 MG: 400 TABLET ORAL at 16:08

## 2023-01-01 RX ADMIN — HYDROMORPHONE HYDROCHLORIDE 6 MG: 2 TABLET ORAL at 10:13

## 2023-01-01 RX ADMIN — IBUPROFEN 600 MG: 400 TABLET ORAL at 09:27

## 2023-01-01 RX ADMIN — SENNOSIDES AND DOCUSATE SODIUM 2 TABLET: 50; 8.6 TABLET ORAL at 09:29

## 2023-01-01 RX ADMIN — HYDROMORPHONE HYDROCHLORIDE 0.5 MG: 1 INJECTION, SOLUTION INTRAMUSCULAR; INTRAVENOUS; SUBCUTANEOUS at 21:24

## 2023-01-01 RX ADMIN — POLYETHYLENE GLYCOL 3350 17 G: 17 POWDER, FOR SOLUTION ORAL at 09:20

## 2023-01-01 RX ADMIN — HYDROMORPHONE HYDROCHLORIDE 0.5 MG: 1 INJECTION, SOLUTION INTRAMUSCULAR; INTRAVENOUS; SUBCUTANEOUS at 20:37

## 2023-01-01 RX ADMIN — IBUPROFEN 600 MG: 400 TABLET ORAL at 21:16

## 2023-01-01 RX ADMIN — SODIUM CHLORIDE 96 ML: 9 INJECTION, SOLUTION INTRAVENOUS at 02:19

## 2023-01-01 RX ADMIN — IBUPROFEN 400 MG: 400 TABLET ORAL at 08:24

## 2023-01-01 RX ADMIN — IBUPROFEN 600 MG: 400 TABLET ORAL at 07:32

## 2023-01-01 RX ADMIN — ACETAMINOPHEN 975 MG: 325 TABLET, FILM COATED ORAL at 19:20

## 2023-01-01 RX ADMIN — HYDROMORPHONE HYDROCHLORIDE 6 MG: 2 TABLET ORAL at 04:38

## 2023-01-01 RX ADMIN — SENNOSIDES AND DOCUSATE SODIUM 2 TABLET: 50; 8.6 TABLET ORAL at 09:28

## 2023-01-01 RX ADMIN — ACETAMINOPHEN 975 MG: 325 TABLET, FILM COATED ORAL at 02:03

## 2023-01-01 RX ADMIN — HYDROMORPHONE HYDROCHLORIDE 6 MG: 2 TABLET ORAL at 16:23

## 2023-01-01 RX ADMIN — CYCLOBENZAPRINE 10 MG: 10 TABLET, FILM COATED ORAL at 01:31

## 2023-01-01 RX ADMIN — MORPHINE SULFATE 15 MG: 15 TABLET, EXTENDED RELEASE ORAL at 07:32

## 2023-01-01 RX ADMIN — IOPAMIDOL 77 ML: 755 INJECTION, SOLUTION INTRAVENOUS at 20:52

## 2023-01-01 RX ADMIN — HYDROMORPHONE HYDROCHLORIDE 4 MG: 2 TABLET ORAL at 18:46

## 2023-01-01 RX ADMIN — HYDROMORPHONE HYDROCHLORIDE 4 MG: 2 TABLET ORAL at 11:18

## 2023-01-01 RX ADMIN — IOPAMIDOL 76 ML: 755 INJECTION, SOLUTION INTRAVENOUS at 18:15

## 2023-01-01 RX ADMIN — BENZONATATE 100 MG: 100 CAPSULE ORAL at 22:16

## 2023-01-01 RX ADMIN — OXYCODONE HYDROCHLORIDE 10 MG: 5 TABLET ORAL at 12:37

## 2023-01-01 RX ADMIN — IBUPROFEN 400 MG: 400 TABLET ORAL at 22:10

## 2023-01-01 RX ADMIN — HYDROMORPHONE HYDROCHLORIDE 2 MG: 2 TABLET ORAL at 17:33

## 2023-01-01 RX ADMIN — PANTOPRAZOLE SODIUM 40 MG: 40 TABLET, DELAYED RELEASE ORAL at 05:03

## 2023-01-01 RX ADMIN — HYDROMORPHONE HYDROCHLORIDE 0.5 MG: 1 INJECTION, SOLUTION INTRAMUSCULAR; INTRAVENOUS; SUBCUTANEOUS at 07:35

## 2023-01-01 RX ADMIN — IBUPROFEN 600 MG: 400 TABLET ORAL at 09:17

## 2023-01-01 RX ADMIN — ACETAMINOPHEN 975 MG: 325 TABLET, FILM COATED ORAL at 20:31

## 2023-01-01 RX ADMIN — ONDANSETRON 4 MG: 2 INJECTION INTRAMUSCULAR; INTRAVENOUS at 20:16

## 2023-01-01 RX ADMIN — FENTANYL CITRATE 50 MCG: 50 INJECTION, SOLUTION INTRAMUSCULAR; INTRAVENOUS at 14:54

## 2023-01-01 RX ADMIN — ONDANSETRON 4 MG: 4 TABLET, ORALLY DISINTEGRATING ORAL at 05:04

## 2023-01-01 RX ADMIN — HYDROMORPHONE HYDROCHLORIDE 0.5 MG: 1 INJECTION, SOLUTION INTRAMUSCULAR; INTRAVENOUS; SUBCUTANEOUS at 21:22

## 2023-01-01 RX ADMIN — ACETAMINOPHEN 975 MG: 325 TABLET, FILM COATED ORAL at 03:48

## 2023-01-01 RX ADMIN — PANTOPRAZOLE SODIUM 40 MG: 40 TABLET, DELAYED RELEASE ORAL at 06:43

## 2023-01-01 RX ADMIN — BENZONATATE 100 MG: 100 CAPSULE ORAL at 21:19

## 2023-01-01 RX ADMIN — LIDOCAINE HYDROCHLORIDE 10 ML: 10 INJECTION, SOLUTION EPIDURAL; INFILTRATION; INTRACAUDAL; PERINEURAL at 14:20

## 2023-01-01 RX ADMIN — HYDROMORPHONE HYDROCHLORIDE 0.5 MG: 1 INJECTION, SOLUTION INTRAMUSCULAR; INTRAVENOUS; SUBCUTANEOUS at 10:30

## 2023-01-01 RX ADMIN — IBUPROFEN 600 MG: 400 TABLET ORAL at 22:16

## 2023-01-01 RX ADMIN — BISACODYL 10 MG: 10 SUPPOSITORY RECTAL at 18:37

## 2023-01-01 RX ADMIN — BENZONATATE 100 MG: 100 CAPSULE ORAL at 08:27

## 2023-01-01 RX ADMIN — POLYETHYLENE GLYCOL 3350 17 G: 17 POWDER, FOR SOLUTION ORAL at 19:21

## 2023-01-01 RX ADMIN — IBUPROFEN 400 MG: 400 TABLET ORAL at 08:21

## 2023-01-01 RX ADMIN — ACETAMINOPHEN 975 MG: 325 TABLET, FILM COATED ORAL at 20:42

## 2023-01-01 RX ADMIN — IBUPROFEN 400 MG: 400 TABLET ORAL at 09:29

## 2023-01-01 RX ADMIN — ONDANSETRON 4 MG: 2 INJECTION INTRAMUSCULAR; INTRAVENOUS at 14:45

## 2023-01-01 RX ADMIN — IBUPROFEN 600 MG: 400 TABLET ORAL at 21:31

## 2023-01-01 RX ADMIN — ACETAMINOPHEN 975 MG: 325 TABLET, FILM COATED ORAL at 05:04

## 2023-01-01 RX ADMIN — CLINDAMYCIN PHOSPHATE 900 MG: 900 INJECTION, SOLUTION INTRAVENOUS at 14:42

## 2023-01-01 RX ADMIN — MIDAZOLAM 1 MG: 1 INJECTION INTRAMUSCULAR; INTRAVENOUS at 14:47

## 2023-01-01 RX ADMIN — HYDROMORPHONE HYDROCHLORIDE 1 MG: 1 INJECTION, SOLUTION INTRAMUSCULAR; INTRAVENOUS; SUBCUTANEOUS at 03:42

## 2023-01-01 RX ADMIN — POLYETHYLENE GLYCOL 3350 17 G: 17 POWDER, FOR SOLUTION ORAL at 08:23

## 2023-01-01 RX ADMIN — IBUPROFEN 600 MG: 400 TABLET ORAL at 21:15

## 2023-01-01 RX ADMIN — FENTANYL CITRATE 50 MCG: 50 INJECTION, SOLUTION INTRAMUSCULAR; INTRAVENOUS at 14:47

## 2023-01-01 RX ADMIN — SENNOSIDES AND DOCUSATE SODIUM 2 TABLET: 50; 8.6 TABLET ORAL at 08:23

## 2023-01-01 RX ADMIN — IBUPROFEN 400 MG: 400 TABLET ORAL at 22:23

## 2023-01-01 RX ADMIN — HYDROMORPHONE HYDROCHLORIDE 2 MG: 2 TABLET ORAL at 08:24

## 2023-01-01 RX ADMIN — HYDROMORPHONE HYDROCHLORIDE 4 MG: 2 TABLET ORAL at 05:04

## 2023-01-01 RX ADMIN — HYDROMORPHONE HYDROCHLORIDE 0.5 MG: 1 INJECTION, SOLUTION INTRAMUSCULAR; INTRAVENOUS; SUBCUTANEOUS at 20:16

## 2023-01-01 RX ADMIN — SENNOSIDES AND DOCUSATE SODIUM 2 TABLET: 50; 8.6 TABLET ORAL at 22:23

## 2023-01-01 RX ADMIN — HYDROMORPHONE HYDROCHLORIDE 4 MG: 2 TABLET ORAL at 18:37

## 2023-01-01 RX ADMIN — MORPHINE SULFATE 15 MG: 15 TABLET, EXTENDED RELEASE ORAL at 09:17

## 2023-01-01 RX ADMIN — BENZONATATE 100 MG: 100 CAPSULE ORAL at 04:25

## 2023-01-01 RX ADMIN — BENZONATATE 100 MG: 100 CAPSULE ORAL at 21:16

## 2023-01-01 RX ADMIN — HYDROMORPHONE HYDROCHLORIDE 6 MG: 2 TABLET ORAL at 07:32

## 2023-01-01 RX ADMIN — HYDROMORPHONE HYDROCHLORIDE 0.5 MG: 1 INJECTION, SOLUTION INTRAMUSCULAR; INTRAVENOUS; SUBCUTANEOUS at 13:44

## 2023-01-01 RX ADMIN — MIDAZOLAM 1 MG: 1 INJECTION INTRAMUSCULAR; INTRAVENOUS at 14:49

## 2023-01-01 RX ADMIN — ONDANSETRON 4 MG: 4 TABLET, ORALLY DISINTEGRATING ORAL at 20:24

## 2023-01-01 RX ADMIN — PANTOPRAZOLE SODIUM 40 MG: 40 TABLET, DELAYED RELEASE ORAL at 06:03

## 2023-01-01 RX ADMIN — SENNOSIDES 2 TABLET: 8.6 TABLET, FILM COATED ORAL at 21:15

## 2023-01-01 RX ADMIN — SODIUM CHLORIDE 500 ML: 9 INJECTION, SOLUTION INTRAVENOUS at 14:00

## 2023-01-01 RX ADMIN — ACETAMINOPHEN 975 MG: 325 TABLET, FILM COATED ORAL at 19:48

## 2023-01-01 RX ADMIN — ACETAMINOPHEN 975 MG: 325 TABLET, FILM COATED ORAL at 03:42

## 2023-01-01 RX ADMIN — HYDROMORPHONE HYDROCHLORIDE 0.5 MG: 1 INJECTION, SOLUTION INTRAMUSCULAR; INTRAVENOUS; SUBCUTANEOUS at 05:13

## 2023-01-01 RX ADMIN — MORPHINE SULFATE 15 MG: 15 TABLET, EXTENDED RELEASE ORAL at 21:15

## 2023-01-01 RX ADMIN — HYDROMORPHONE HYDROCHLORIDE 0.5 MG: 1 INJECTION, SOLUTION INTRAMUSCULAR; INTRAVENOUS; SUBCUTANEOUS at 08:24

## 2023-01-01 RX ADMIN — PANTOPRAZOLE SODIUM 40 MG: 40 TABLET, DELAYED RELEASE ORAL at 06:00

## 2023-01-01 RX ADMIN — AZITHROMYCIN MONOHYDRATE 500 MG: 500 INJECTION, POWDER, LYOPHILIZED, FOR SOLUTION INTRAVENOUS at 23:01

## 2023-01-01 RX ADMIN — ACETAMINOPHEN 975 MG: 325 TABLET, FILM COATED ORAL at 12:33

## 2023-01-01 RX ADMIN — IBUPROFEN 600 MG: 400 TABLET ORAL at 16:22

## 2023-01-01 RX ADMIN — MORPHINE SULFATE 15 MG: 15 TABLET, EXTENDED RELEASE ORAL at 13:32

## 2023-01-01 RX ADMIN — POLYETHYLENE GLYCOL 3350 17 G: 17 POWDER, FOR SOLUTION ORAL at 21:32

## 2023-01-01 RX ADMIN — IBUPROFEN 600 MG: 400 TABLET ORAL at 16:23

## 2023-01-01 RX ADMIN — ACETAMINOPHEN 975 MG: 325 TABLET, FILM COATED ORAL at 12:37

## 2023-01-01 RX ADMIN — HYDROMORPHONE HYDROCHLORIDE 0.5 MG: 1 INJECTION, SOLUTION INTRAMUSCULAR; INTRAVENOUS; SUBCUTANEOUS at 14:20

## 2023-01-01 RX ADMIN — HYDROMORPHONE HYDROCHLORIDE 0.5 MG: 1 INJECTION, SOLUTION INTRAMUSCULAR; INTRAVENOUS; SUBCUTANEOUS at 20:19

## 2023-01-01 RX ADMIN — POLYETHYLENE GLYCOL 3350 17 G: 17 POWDER, FOR SOLUTION ORAL at 10:47

## 2023-01-01 RX ADMIN — IBUPROFEN 600 MG: 400 TABLET ORAL at 10:52

## 2023-01-01 RX ADMIN — ACETAMINOPHEN 975 MG: 325 TABLET, FILM COATED ORAL at 12:18

## 2023-01-01 RX ADMIN — OXYCODONE HYDROCHLORIDE 5 MG: 5 TABLET ORAL at 01:30

## 2023-01-01 RX ADMIN — CEFTRIAXONE SODIUM 2 G: 2 INJECTION, POWDER, FOR SOLUTION INTRAMUSCULAR; INTRAVENOUS at 22:16

## 2023-01-01 RX ADMIN — PANTOPRAZOLE SODIUM 40 MG: 40 TABLET, DELAYED RELEASE ORAL at 07:25

## 2023-01-01 RX ADMIN — HYDROMORPHONE HYDROCHLORIDE 4 MG: 2 TABLET ORAL at 16:25

## 2023-01-01 RX ADMIN — HYDROMORPHONE HYDROCHLORIDE 4 MG: 2 TABLET ORAL at 00:43

## 2023-01-01 RX ADMIN — ACETAMINOPHEN 975 MG: 325 TABLET, FILM COATED ORAL at 12:10

## 2023-01-01 RX ADMIN — IBUPROFEN 600 MG: 400 TABLET ORAL at 16:21

## 2023-01-01 RX ADMIN — ACETAMINOPHEN 975 MG: 325 TABLET, FILM COATED ORAL at 21:19

## 2023-01-01 RX ADMIN — LIDOCAINE HYDROCHLORIDE 20 ML: 10 INJECTION, SOLUTION INFILTRATION; PERINEURAL at 14:50

## 2023-01-01 RX ADMIN — HYDROMORPHONE HYDROCHLORIDE 0.5 MG: 1 INJECTION, SOLUTION INTRAMUSCULAR; INTRAVENOUS; SUBCUTANEOUS at 01:20

## 2023-01-01 ASSESSMENT — ACTIVITIES OF DAILY LIVING (ADL)
ADLS_ACUITY_SCORE: 32
ADLS_ACUITY_SCORE: 39
ADLS_ACUITY_SCORE: 36
ADLS_ACUITY_SCORE: 39
ADLS_ACUITY_SCORE: 33
ADLS_ACUITY_SCORE: 32
ADLS_ACUITY_SCORE: 33
ADLS_ACUITY_SCORE: 34
ADLS_ACUITY_SCORE: 33
ADLS_ACUITY_SCORE: 39
ADLS_ACUITY_SCORE: 33
ADLS_ACUITY_SCORE: 32
ADLS_ACUITY_SCORE: 34
ADLS_ACUITY_SCORE: 32
ADLS_ACUITY_SCORE: 33
ADLS_ACUITY_SCORE: 32
ADLS_ACUITY_SCORE: 33
ADLS_ACUITY_SCORE: 39
ADLS_ACUITY_SCORE: 32
ADLS_ACUITY_SCORE: 33
ADLS_ACUITY_SCORE: 32
ADLS_ACUITY_SCORE: 32
ADLS_ACUITY_SCORE: 33
ADLS_ACUITY_SCORE: 33
ADLS_ACUITY_SCORE: 35
ADLS_ACUITY_SCORE: 32
ADLS_ACUITY_SCORE: 33
ADLS_ACUITY_SCORE: 33
ADLS_ACUITY_SCORE: 32
ADLS_ACUITY_SCORE: 34
ADLS_ACUITY_SCORE: 32
ADLS_ACUITY_SCORE: 33
ADLS_ACUITY_SCORE: 33
ADLS_ACUITY_SCORE: 32
ADLS_ACUITY_SCORE: 32
ADLS_ACUITY_SCORE: 34
ADLS_ACUITY_SCORE: 34
ADLS_ACUITY_SCORE: 32
ADLS_ACUITY_SCORE: 32
ADLS_ACUITY_SCORE: 33
ADLS_ACUITY_SCORE: 34
ADLS_ACUITY_SCORE: 39
ADLS_ACUITY_SCORE: 32
ADLS_ACUITY_SCORE: 32
ADLS_ACUITY_SCORE: 39
ADLS_ACUITY_SCORE: 33
ADLS_ACUITY_SCORE: 35
ADLS_ACUITY_SCORE: 39
ADLS_ACUITY_SCORE: 39
ADLS_ACUITY_SCORE: 32
ADLS_ACUITY_SCORE: 33
ADLS_ACUITY_SCORE: 39
ADLS_ACUITY_SCORE: 33
ADLS_ACUITY_SCORE: 39
ADLS_ACUITY_SCORE: 32
ADLS_ACUITY_SCORE: 35
ADLS_ACUITY_SCORE: 35
ADLS_ACUITY_SCORE: 33
ADLS_ACUITY_SCORE: 33
ADLS_ACUITY_SCORE: 32
ADLS_ACUITY_SCORE: 34
ADLS_ACUITY_SCORE: 34
ADLS_ACUITY_SCORE: 39
ADLS_ACUITY_SCORE: 33
ADLS_ACUITY_SCORE: 33
ADLS_ACUITY_SCORE: 32
ADLS_ACUITY_SCORE: 32
ADLS_ACUITY_SCORE: 39
ADLS_ACUITY_SCORE: 32
ADLS_ACUITY_SCORE: 32
ADLS_ACUITY_SCORE: 39
ADLS_ACUITY_SCORE: 32
ADLS_ACUITY_SCORE: 33
ADLS_ACUITY_SCORE: 32
ADLS_ACUITY_SCORE: 33

## 2023-08-08 NOTE — ED PROVIDER NOTES
"  History     Chief Complaint:  Chest Pain       The history is provided by the patient.      Alyson Escobar is a 62 year old female who presents with chest pain. Patient says this pain has been ongoing for about 2 months and onsets 20 minutes after waking up and does not go away. She thought that is was a muscle strain and got a CT scan done in [place], [place] where nothing was found and was insured that it was mostly like a muscle strain. Patient says that the pain progressively worsened after her visit, but was not able to get it checked due to the passing of her daughter. She says the pain in her back and underneath her arm are constant and \"aching\". The pain in her chest is also a constant aching pain, but often changes to a \"burning and throbbing\" feeling. She expresses that the pain is not exacerbated with movement but she feels pressure when she is lying down on her side. Patient reports she has had a double mastectomy due to breast cancer and is concerned her pain is due to that. She endorses tenderness on her chest, back, and under arm with palpation. She denies nausea, vomiting, fever, shortness of breath, swelling in her legs, or vomiting blood.    Independent Historian:   None - Patient Only        Medications:    The patient is not currently taking any prescribed medications.    Past Medical History:    No past medical history.    Physical Exam   Patient Vitals for the past 24 hrs:   Temp Temp src Pulse Resp SpO2   08/07/23 2056 97.8  F (36.6  C) Temporal 78 18 97 %        Physical Exam  Eyes:  The pupils are equal and round    Conjunctivae and sclerae are normal  ENT:    The nose is normal    Pinnae are normal  CV:  Regular rate and rhythm     No edema  Resp:  Lungs are clear    Non-labored    No rales    No wheezing   GI:  Abdomen is soft and non-tender, there is no rigidity    No distension    No rebound tenderness   MS:  Normal muscular tone    No asymmetric leg swelling    Tenderness of " left upper back, left side and left anterior chest  Skin:  No rash or acute skin lesions noted  Neuro:   Awake, alert.      Speech is normal and fluent.    Face is symmetric.     Moves all extremities      Emergency Department Course   ECG  ECG taken at 2101, ECG read at 0122  Normal sinus rhythm with sinus arrhythmia  Normal arrhythmia   Rate 74 bpm. NY interval 112 ms. QRS duration 66 ms. QT/QTc 372/412 ms. P-R-T axes 53 19 41.     Imaging:  CT Chest Pulmonary Embolism w Contrast   Final Result   IMPRESSION:   1.  No pulmonary embolism seen.      2.  Lytic destruction of the left fourth lateral mid rib with associated mass measuring approximately 6.1 x 4.5 x 4.6 cm.      Chest XR,  PA & LAT   Final Result   IMPRESSION:       Left lateral fourth rib fracture. Adjacent pleural-based soft tissue density measuring 7.0 x 1.6 cm. Findings may represent pleural hematoma/thickening related to acute rib fracture versus pleural-based mass with adjacent rib involvement and pathologic    fracture. Consider correlation with CT.      No focal airspace disease. No pleural effusion or pneumothorax.      The cardiomediastinal silhouette is unremarkable.      Upper abdominal surgical clips.         Report per radiology    Laboratory:  Labs Ordered and Resulted from Time of ED Arrival to Time of ED Departure   COMPREHENSIVE METABOLIC PANEL - Abnormal       Result Value    Sodium 138      Potassium 3.9      Chloride 99      Carbon Dioxide (CO2) 24      Anion Gap 15      Urea Nitrogen 14.2      Creatinine 0.92      Calcium 9.5      Glucose 112 (*)     Alkaline Phosphatase 132 (*)     AST 15      ALT 11      Protein Total 7.7      Albumin 4.2      Bilirubin Total 0.3      GFR Estimate 69     CBC WITH PLATELETS AND DIFFERENTIAL - Abnormal    WBC Count 14.0 (*)     RBC Count 4.83      Hemoglobin 14.5      Hematocrit 44.4      MCV 92      MCH 30.0      MCHC 32.7      RDW 13.7      Platelet Count 518 (*)     % Neutrophils 64      %  Lymphocytes 26      % Monocytes 7      % Eosinophils 2      % Basophils 1      % Immature Granulocytes 0      NRBCs per 100 WBC 0      Absolute Neutrophils 9.0 (*)     Absolute Lymphocytes 3.7      Absolute Monocytes 1.0      Absolute Eosinophils 0.3      Absolute Basophils 0.1      Absolute Immature Granulocytes 0.1      Absolute NRBCs 0.0     TROPONIN T, HIGH SENSITIVITY - Normal    Troponin T, High Sensitivity 8          Procedures   None    Emergency Department Course & Assessments:       Interventions:  Medications - No data to display     Assessments:  0115 I obtained history and examined the patient as noted above.   0323 I rechecked and updated th patient.    Independent Interpretation (X-rays, CTs, rhythm strip):  None    Consultations/Discussion of Management or Tests:  None       Social Determinants of Health affecting care:   None    Disposition:  The patient was discharged to home.     Impression & Plan    Medical Decision Making:  Alyson Escobar is a 62 year old female with prior history of breast cancer presents to the emergency department with pain along the left side of her chest that is been ongoing for weeks.  She was evaluated for this in [place] where she had a CT scan performed which she reports was negative.  She does not have any of the results available to her.  Unable to get any of the results on care everywhere.  X-ray obtained here shows evidence of a lytic lesion in her left fourth rib with associated fracture.  CT scan performed to further evaluate.  Troponin was negative.  CT scan did not show any pulmonary embolisms but did show the lytic lesion on the fourth rib.  There is an associated mass.  Patient has a history of prior breast cancer that was treated.  Suspect that this could possibly be the cause.  Recommended that she follow-up with oncology and referral was given through the [place] [facility] fast pass call line.  She was given pain medication here as well as a  prescription for pain medication to use at home.  Patient verbalized understanding of findings and was discharged.    Diagnosis:    ICD-10-CM    1. Lytic lesion of bone on x-ray  M89.9       2. Closed fracture of one rib of left side, initial encounter  S22.32XA            Discharge Medications:  Discharge Medication List as of 8/8/2023  3:40 AM        START taking these medications    Details   oxyCODONE (ROXICODONE) 5 MG tablet Take 1 tablet (5 mg) by mouth every 6 hours as needed for pain, Disp-12 tablet, R-0, Local Print              Scribe Disclosure:  Rachid MOSQUERA, am serving as a scribe at 4:24 AM on 8/8/2023 to document services personally performed by Justino Hawk MD  based on my observations and the provider's statements to me.     8/8/2023   Justino Hawk MD [place], Aaron Joseph, MD  08/08/23 0890

## 2023-08-08 NOTE — ED TRIAGE NOTES
Pt comes in for 2 months of chest pain on L side that radiates to her L back shoulder. Says it has been getting worse. Ibuprofen and tylenol not working. No heart hx     Triage Assessment       Row Name 08/07/23 2059       Triage Assessment (Adult)    Airway WDL WDL       Respiratory WDL    Respiratory WDL WDL       Skin Circulation/Temperature WDL    Skin Circulation/Temperature WDL WDL       Cardiac WDL    Cardiac WDL chest pain;X       Chest Pain Assessment    Chest Pain Location anterior chest, left    Chest Pain Radiation shoulder;back    Character sharp    Chest Pain Intervention 12-lead ECG obtained       Peripheral/Neurovascular WDL    Peripheral Neurovascular WDL WDL       Cognitive/Neuro/Behavioral WDL    Cognitive/Neuro/Behavioral WDL WDL

## 2023-09-22 PROBLEM — R52 INTRACTABLE PAIN: Status: ACTIVE | Noted: 2023-01-01

## 2023-09-22 PROBLEM — J90 PLEURAL EFFUSION, LEFT: Status: ACTIVE | Noted: 2023-01-01

## 2023-09-22 PROBLEM — R22.2 CHEST WALL MASS: Status: ACTIVE | Noted: 2023-01-01

## 2023-09-23 NOTE — CONSULTS
Hematology oncology consult note.    Date of visit.  9/23/2023.    Date of admission.  9/22/2023.    Reason for consult.  Metastatic breast cancer.    HPI.    I have also reviewed notes from outside hospital when she was treated in [place] for breast cancer.    In September 2020 she was noted to have right-sided multicentric cIIIB grade 3, IDCa ([id number], ER/TN pos, Her2 neg, Ki67 60%), with positive bulky adenopathy; she was also noted to have left - multicentric, c0 (Tis (high-grade DCIS), ER/ TN pos)     She started neoadjuvant Taxol followed by dose dense AC which she completed in March 2021.  Initially surgery was delayed due to insurance issues and she was started on Femara.    6/9/2021.  She had bilateral simple mastectomy with right axillary sentinel lymph node biopsy.  Final pathology did not show any residual malignancy.    9/7/2023.  CT chest abdomen and pelvis showed multiple areas of left pleural nodularity.  New small left pleural effusion.  Subcentimeter low right paratracheal and subcentimeter left internal mammary chain lymph nodes.  3 cm right hilar lymph node.  As compared to CT scan from 8/8/2023, there is increase in the size of heterogeneously enhancing mass in the left chest wall associated with osseous destruction of left third and fourth ribs.  Mass now measures 7 x 5 cm while previously it was 5.5 x 3.3 cm.  There is central necrosis.  There is increasing contagious lobulated pleural nodularity anteriorly and inferiorly as well as along the left mediastinum.    Few scattered subcentimeter hypoattenuating lesions are seen in the liver which are too small to characterize.  1.5 cm soft tissue nodule is seen in the right and perihepatic space.  There is a right anterior abdominal wall dermal nodule.  Lytic lesion is seen in the T7 vertebral body and anterior T9 vertebral body.  Lytic lesion is also seen in the left iliac bone.    9/14/2023.  She had CT-guided left chest wall mass biopsy.    Biopsy  was consistent with metastatic carcinoma from breast primary.The lesional cells are positive for cytokeratin 7 and GATA3, but negative for cytokeratin 20, TTF1, PAX8, calretinin, D2-40, and CD31     ER and IL negative.  HER2 by IHC was negative with score 1+    She now presented with increasing uncontrollable pain including chest wall pain.  She is also having pain in the left shoulder and left hip.  She mentions that the pain has been going on since the spring 2023.  She came to [place] about 3 months ago.  Has some nausea but denies vomiting.  No neuropathy.  No diarrhea or constipation.  No new swellings.  She mentioned that after the surgery for breast cancer, she was on hormonal therapy for a few months but then her daughter suddenly  in car accident so she became depressed and stopped following with oncologist and has not been on any treatment for breast cancer over the last couple of years.    When she came to the hospital she had work-up done    CT chest PE protocol on 2022 showed no evidence of PE.  Increase in the size of the left pleural effusion which is now large and significant interval enlargement of previously seen left lateral chest wall mass which now measures 5.3 x 7.3 x 7.3 cm.  There is associated destruction of the left third and fourth ribs which has also progressed.   There is new mild erosion along the fifth rib as it courses along the posterior margin of the mass. New focal lytic lesion within the anterolateral aspect of the left seventh rib.   Interval enlargement of lytic lesion with within the lower thoracic spine , measuring 1.5 cm at T9. Suggestion of additional lytic lesions at T7, new, measuring 0.9 and 1.0 cm.    ECOG 1    ROS:  A comprehensive ROS was otherwise neg    Past history  Breast cancer as  above  She has disability due to disc disease and previous spine surgeries.  Fibromyalgia.  Rheumatoid arthritis  Anxiety    Active Ambulatory Problems     Diagnosis Date  Noted    No Active Ambulatory Problems     Resolved Ambulatory Problems     Diagnosis Date Noted    No Resolved Ambulatory Problems     No Additional Past Medical History   Family history  No family history on file.   Mother had pancreatic cancer.  Father probably had some sort of cancer but she is unsure.  She had 1 daughter who  in car accident in .    Medications  Current Facility-Administered Medications   Medication    acetaminophen (TYLENOL) tablet 975 mg    HYDROmorphone (PF) (DILAUDID) injection 0.3 mg    HYDROmorphone (PF) (DILAUDID) injection 0.5 mg    ibuprofen (ADVIL/MOTRIN) tablet 400 mg    Lidocaine (LIDOCARE) 4 % Patch 2 patch    melatonin tablet 1 mg    naloxone (NARCAN) injection 0.2 mg    Or    naloxone (NARCAN) injection 0.4 mg    Or    naloxone (NARCAN) injection 0.2 mg    Or    naloxone (NARCAN) injection 0.4 mg    ondansetron (ZOFRAN ODT) ODT tab 4 mg    Or    ondansetron (ZOFRAN) injection 4 mg    ondansetron (ZOFRAN) injection 4 mg    oxyCODONE (ROXICODONE) tablet 10 mg    oxyCODONE (ROXICODONE) tablet 15 mg    pantoprazole (PROTONIX) EC tablet 40 mg    polyethylene glycol (MIRALAX) Packet 17 g    senna-docusate (SENOKOT-S/PERICOLACE) 8.6-50 MG per tablet 1 tablet    Or    senna-docusate (SENOKOT-S/PERICOLACE) 8.6-50 MG per tablet 2 tablet           Allergies     Allergies   Allergen Reactions    Dog Epithelium Allergy Skin Test      Other Reaction(s): respiratory distress    Pollen Extract Other (See Comments)    Penicillin G Swelling and Rash         Social history  Social History     Socioeconomic History    Marital status: Single     Spouse name: Not on file    Number of children: Not on file    Years of education: Not on file    Highest education level: Not on file   Occupational History    Not on file   Tobacco Use    Smoking status: Not on file    Smokeless tobacco: Not on file   Substance and Sexual Activity    Alcohol use: Not on file    Drug use: Not on file    Sexual  "activity: Not on file   Other Topics Concern    Not on file   Social History Narrative    Not on file     Social Determinants of Health     Financial Resource Strain: Not on file   Food Insecurity: Not on file   Transportation Needs: Not on file   Physical Activity: Not on file   Stress: Not on file   Social Connections: Not on file   Interpersonal Safety: Not on file   Housing Stability: Not on file     She used to smoke 1 pack a day but now has cut down on smoking 3 cigarettes a day.  Denies any alcohol use.  Lives with her friend and her friend's .      Physical examination    /79 (BP Location: Left arm)   Pulse 65   Temp 97.9  F (36.6  C) (Oral)   Resp 19   Ht 1.676 m (5' 6\")   Wt 98.8 kg (217 lb 14.4 oz)   SpO2 95%   BMI 35.17 kg/m     Wt Readings from Last 4 Encounters:   09/23/23 98.8 kg (217 lb 14.4 oz)   08/08/23 90.7 kg (200 lb)     CONSTITUTIONAL: no acute distress  EYES: no palor or icterus.   ENT/MOUTH: no mouth lesions. Ears normal  CVS: s1s2 no m r g .   RESPIRATORY: Decreased breath sounds at the left side of the lung  GI: soft non tender no hepatosplenomegaly  NEURO: AAOX3  Grossly non focal neuro exam  INTEGUMENT: no obvious rashes  LYMPHATIC: no palpable cervical, supraclavicular, axillary or inguinal LAD  MUSCULOSKELETAL: There is some tenderness to palpation in the left chest wall and the left hip.  EXTREMITIES: no edema  PSYCH: Mentation, mood and affect are normal. Decision making capacity is intact      Labs/imaging/pathology    Reviewed  CBC shows WBC 18.3.  Hemoglobin 12.1.  Platelets 658.  Chemistry was unremarkable except  Alkaline phosphatase 132.  .    Imaging and pathology reviewed and mentioned above.      Assessment and recommendations.    Triple negative, ER/MO negative, HER2 IHC 1+, metastatic breast cancer with increasing left chest wall mass causing destruction of associated ribs.  Evidence of multiple metastasis to the bones including lytic lesion in " the left iliac bone.  There is increasing left pleural effusion.  She has large left pleural effusion causing shortness of breath.    In 2020 when she was treated for stage III right-sided ER/WV positive multifocal breast cancer with neoadjuvant Taxol/dose dense AC followed by bilateral simple mastectomy with right axillary lymph node biopsy showing complete pathological response.    Now she has evidence of metastatic breast cancer which is triple negative and she presented with increasing pain in the left chest wall/left shoulder and left hip.    Doing better with Dilaudid but I would recommend that for pain control we should involve palliative care and also radiation oncology for palliative radiation.    I think she has a good chance of benefiting from palliative radiation in terms of pain control.  After that she would need to be started on  palliative systemic chemotherapy as outpatient.  She would also need outpatient bone scan or PET scan.    We need detailed records from [place]. We also need pathology to be reviewed by our pathologist. I would recommend checking PDL1 test and she will also benefit from additional mutation analysis like [facility] 1    Left pleural effusion.  She has large left pleural effusion causing shortness of breath.  I would recommend diagnostic and therapeutic tap.    History of rheumatoid arthritis/fibromyalgia.  Would recommend outpatient rheumatology consult.    Smoking.  Congratulated her on her efforts on cutting down smoking and counseled her regarding working hard to completely quit smoking.    She will need close outpatient oncology follow-up at [facility].    I answered all of her questions to her satisfaction.  She is agreeable and comfortable with this plan.    Please call with any questions.  Thank you for the consult.    Jeanne Chakraborty MD

## 2023-09-23 NOTE — PROGRESS NOTES
"CLINICAL NUTRITION SERVICES  -  ASSESSMENT NOTE    Recommendations Ordered by Registered Dietitian (RD):   Ordered chocolate Ensure Enlive at 2pm   Malnutrition:   % Weight Loss:  > 7.5% in 3 months (severe malnutrition) - per pt report  % Intake:  <75% for >/= 3 months (moderate malnutrition)  Subcutaneous Fat Loss:  None observed  Muscle Loss:  None observed  Fluid Retention:  None noted    Malnutrition Diagnosis: Moderate malnutrition  In Context of:  Acute illness or injury  Chronic illness or disease     REASON FOR ASSESSMENT  Alyson Escobar is a 63 year old female seen by Registered Dietitian for Admission Nutrition Risk Screen for answering \"yes\" to recently losing wt without trying (2-13 pounds) and \"yes\" to recently eating poorly d/t a decrease in appetite.    PMH of breast cancer diagnosed 2 years ago followed by 1 year of chemotherapy (now not on at this time). Presents with chest pain.    NUTRITION HISTORY    - Spoke with patient at bedside. She said she has had a decrease in appetite/intakes for the past 3 months d/t pain and nausea. She said her pain is the biggest barrier to eating, it makes her not want to consume any food at all. She has only been eating 1 meal/day and snacking throughout the day. She said she knows she has not been getting enough nutrition for the past 3 months. She said in this timeframe she has lost about 20 pounds. 2 years ago she had Ensure when she had breast cancer and she is willing to try it again to help meet protein and energy needs at this time. Writer offered support and encouraged intakes.    CURRENT NUTRITION ORDERS  Diet Order: Regular     Current Intake/Tolerance:  - 50% intake recorded this AM per flowsheet  - Per patient she had breakfast this morning which is unlike her, usually only has coffee in AM    NUTRITION FOCUSED PHYSICAL ASSESSMENT FOR DIAGNOSING MALNUTRITION)  Yes         Observed:    No nutrition-related physical findings " "observed    ANTHROPOMETRICS  Height: 5' 6\"  Weight: 98.8 kg, 217 lbs 14.4 oz  Body mass index is 35.17 kg/m .  Weight Status:  Obesity Grade II BMI 35-39.9  IBW: 59.1 kg  % IBW: 167%  Weight History: no wt loss noted, however limited data. Per patient lost 20 pounds in 3 months (8.4%). Looks as if wt on 8/8/23 could potentially be a stated weight.  Wt Readings from Last 10 Encounters:   09/23/23 98.8 kg (217 lb 14.4 oz)   08/08/23 90.7 kg (200 lb)     LABS  Labs reviewed    MEDICATIONS  Medications reviewed    ASSESSED NUTRITION NEEDS PER APPROVED PRACTICE GUIDELINES:  Dosing Weight 69 kg (adjusted)  Estimated Energy Needs: 0585-1967 kcals (25-30 Kcal/Kg)  Justification: maintenance  Estimated Protein Needs: 69-83 grams protein (1-1.2 g pro/Kg)  Justification: preservation of lean body mass  Estimated Fluid Needs: 1 mL/kcal or per provider pending fluid status    NUTRITION DIAGNOSIS:  Inadequate oral intake related to poor appetite d/t pain, nausea as evidenced by pt report of decreased intakes and wt loss in the past 3 months    NUTRITION INTERVENTIONS  Recommendations / Nutrition Prescription  Ordered Ensure x1    Implementation  Nutrition education: Per Provider order if indicated   Medical Food Supplement - ordered    Nutrition Goals  Patient to consume >50% of nutritionally adequate meal trays TID, or the equivalent with supplements/snacks.    MONITORING AND EVALUATION:  Progress towards goals will be monitored and evaluated per protocol and Practice Guidelines    Jaz Branham RD  Clinical Dietitian - [facility]  "

## 2023-09-23 NOTE — ED TRIAGE NOTES
Patient  here  with rib pain. She stated  she has several broken ribs. She also stated she had  a recent biopsy which indicated she has cancer. She has no information on the type of cancer she has     Triage Assessment       Row Name 09/22/23 1929       Triage Assessment (Adult)    Airway WDL WDL       Respiratory WDL    Respiratory WDL WDL       Skin Circulation/Temperature WDL    Skin Circulation/Temperature WDL WDL       Cardiac WDL    Cardiac WDL WDL       Peripheral/Neurovascular WDL    Peripheral Neurovascular WDL WDL       Cognitive/Neuro/Behavioral WDL    Cognitive/Neuro/Behavioral WDL WDL

## 2023-09-23 NOTE — H&P
[facility]    History and Physical - Hospitalist Service       Date of Admission:  9/22/2023    Assessment & Plan        Alyson Escobar is a 63 year old female, who recently moved from [place], and was diagnosed with  metastatic breast cancer recently, presents to ER on 9/22/2023 with uncontrolled pain due to cancer.    She was diagnosed with stage III bilateral breast cancer 2 years ago in [place] and was treated with bilateral mastectomies followed by approximately 1 year of chemotherapy.      She presented to ER on 8/7/2023 with chest wall pain.  CT showed lytic destruction of Left fourth rib with associated mass measuring 6.1 x 4.5 x 4.6 cm.  She underwent biopsy on 9/14 that showed this to be metastatic carcinoma consistent with breast primary.  She has not establish care with oncology yet.    CT PE on admission showed - large left pleural effusion which could be complex or malignant.  Significant interval enlargement of left chest wall mass, now measuring 5.3 x 7.3 x 7.3 cm.  There is associated destruction of left third and fourth ribs which has progressed.  There are new lesions within the left ribs fifth and seventh.  There are new lesions in thoracic vertebral bodies at T9, T7.      Metastatic breast cancer with left chest wall mass measuring 5.3 x 7.3 x 7.3 cm, lytic lesions of left third, fourth, fifth and seventh ribs lytic lesions of T9 and T7 vertebral bodies  Large left pleural effusion-most likely malignant.  Infection not ruled out  Uncontrolled pain due to metastatic breast cancer  Leukocytosis of 19  -Has been taking oxycodone 10 mg 4 times a day but pain has remained uncontrolled.  -Has leukocytosis of 19 which could secondary to metastatic breast cancer but infection is not completely ruled out.  She is otherwise afebrile and does not have any significant cough or respiratory symptoms.  -Large left pleural effusion noted on CT is likely malignant but again infection is  "not ruled out-  -For pain control-start on scheduled Tylenol 975 mg 3 times daily, ibuprofen 400 mg 3 times daily, lidocaine patches  -Add as needed oxycodone 10-15 mg every 4 hours as needed and IV Dilaudid 0.3-0.5 mg every 2 hours as needed  -Patient has not established care with oncology yet.  He is interested in establishing care here.  Consult [facility].  Tumor size has already significantly increased in 6 weeks.  -Orders and labs placed for thoracentesis for tomorrow  -Suspicion for infection is low.  Effusion is likely malignant.  She already received antibiotics that will cover for 24 hours.  Hold no further antibiotics until thoracentesis    Leukocytosis of 19  Thrombocytosis, platelet of 708k  -Likely secondary to metastatic cancer  -Oncology consult    Left hip pain  -X-ray showed mild degenerative arthritis but no acute fractures or lytic lesions.  -If continues to experience significant discomfort, will need CT for further evaluation             Diet:  Regular diet  DVT Prophylaxis: Pneumatic Compression Devices  Garner Catheter: Not present  Lines: None     Cardiac Monitoring: None  Code Status:  DNR/DNI    Clinically Significant Risk Factors Present on Admission                       # Obesity: Estimated body mass index is 35.51 kg/m  as calculated from the following:    Height as of this encounter: 1.676 m (5' 6\").    Weight as of this encounter: 99.8 kg (220 lb).              Disposition Plan      Expected Discharge Date: 09/24/2023                  Denisse Love MD  Hospitalist Service  [facility]  Securely message with [employer] (more info)  Text page via babbel Paging/Directory     ______________________________________________________________________    Chief Complaint     Uncontrolled pain    History is obtained from the patient    History of Present Illness   Alyson Escobar is a 63 year old female recent diagnosis of metastatic breast cancer, chronic back pain " who presents to ER on 9/22/2023 with uncontrolled pain due to cancer.    She recently moved to [place] from [place].  She was diagnosed with bilateral breast cancer 2 years ago in [place].  It was apparently stage III and was treated with bilateral mastectomies followed by approximately 1 year of chemotherapy.  She did not receive.  Radiation therapy and is not on chemotherapy at this time.    She presented to ER on 8/7/2023 with chest wall pain.  CT PE at that time showed lytic destruction    Left fourth rib with associated mass measuring 6.1 x 4.5 x 4.6 cm.  She subsequently established care at [facility] and underwent biopsy on 9/14 that showed this to be metastatic carcinoma consistent with breast primary.  She has not establish care with oncology yet.    Due to significant pain, she was prescribed oxycodone by her PCP and has been taking oxycodone 10 mg 4 times a day but her pain has remained uncontrolled.    She presents to ER today due to uncontrolled pain.  Reports severe pain in her left chest, left shoulder, left back and left hip.  She also reports increased shortness of breath.  Has mild cough.  No fever or chills.    Work-up showed leukocytosis of 14.4, up from 14 on 8/7, thrombocytosis with platelet of 708k    CT PE was obtained that showed no PE, large left pleural effusion which could be complex or malignant.  Significant interval enlargement of left chest wall mass, now measuring 5.3 x 7.3 x 7.3 cm.  There is associated destruction of left third and fourth ribs which has progressed.  There are new lesions within the left ribs fifth and seventh.  There are new lesions in thoracic vertebral bodies at T9, T7.    Due to leukocytosis and large left pleural effusion, she was started on IV ceftriaxone and azithromycin    X-ray pelvis with left hip showed mild degenerative arthritis of both hips.  No acute fractures or lytic lesions.    X-ray left shoulder showed mild degenerative arthritis of  acromioclavicular and glenohumeral joints.  No acute fractures.      Past Medical History    No past medical history on file.    Past Surgical History   No past surgical history on file.    Prior to Admission Medications   Prior to Admission Medications   Prescriptions Last Dose Informant Patient Reported? Taking?   ibuprofen (ADVIL/MOTRIN) 200 MG tablet prn at prn  Yes Yes   Sig: Take 400 mg by mouth 3 times daily as needed for pain . If pain not relieved with oxycodone.   oxyCODONE IR (ROXICODONE) 10 MG tablet 9/22/2023 at x1  Yes Yes   Sig: Take 5 mg by mouth 3 times daily   zolpidem (AMBIEN) 10 MG tablet not yet started at not yet started  Yes No   Sig: Take 10 mg by mouth nightly as needed for sleep      Facility-Administered Medications: None           Physical Exam   Vital Signs: Temp: 97.7  F (36.5  C)   BP: (!) 158/50 Pulse: 94   Resp: 18 SpO2: 97 % O2 Device: None (Room air)    Weight: 220 lbs 0 oz    Constitutional - alert, resting in bed, appears comfortable  Head - normocephalic, atraumatic  ENT - normal eye lids and lashes, no conjunctival hyperemia, no icterus, extraocular movements are normal, normal nose, no discharge, moist oral mucosa, no ulcers or exudates, normal external ear  Neck - no thyromegaly or lymphadenopathy. Tracheal is midline  CV - regular rate and rhythm, no murmurs, no edema  Pulmonary -breath sounds diminished on left   GI - abdomen is soft, non distended, non tender, bowel sounds are present, no organomegaly  Neurological - alert and oriented, normal speech, no focal deficits  Musculoskeletal - no joint erythema or swelling, ROM is ok          Medical Decision Making       55 MINUTES SPENT BY ME on the date of service doing chart review, history, exam, documentation & further activities per the note.      Data     I have personally reviewed the following data over the past 24 hrs:    19.4 (H)  \   13.7   / 708 (H)     140 97 (L) 12.2 /  116 (H)   3.5 29 0.69 \       Imaging  results reviewed over the past 24 hrs:   Recent Results (from the past 24 hour(s))   XR Shoulder Left G/E 3 Views    Narrative    EXAM: XR SHOULDER LEFT G/E 3 VIEWS  LOCATION: [facility]  DATE: 9/22/2023    INDICATION: Left shoulder pain.  COMPARISON: Same day CT chest      Impression    IMPRESSION: Mild degenerative arthritis of the acromioclavicular and glenohumeral joints. No acute fracture.     Left chest wall mass and destruction of the left lateral third and fourth ribs. Findings are better characterized and further discussed on the same day CTA chest.   XR Pelvis w Hip Left 1 View    Narrative    EXAM: XR PELVIS AND HIP LEFT 1 VIEW  LOCATION: [facility]  DATE: 9/22/2023    INDICATION: Left hip pain. Cancer.  COMPARISON: None.      Impression    IMPRESSION: Mild degenerative arthritis both hips. No acute fracture. No discrete lytic or blastic osseous lesion. Postoperative changes of the lumbosacral spine with posterior spinal fixation hardware and interbody spacers.   CT Chest Pulmonary Embolism w Contrast    Narrative    EXAM: CT CHEST PULMONARY EMBOLISM W CONTRAST  LOCATION: [facility]  DATE: 9/22/2023    INDICATION: cp, sob, known bony mets and rib fxs  COMPARISON: 8/8/2023.  TECHNIQUE: CT chest pulmonary angiogram during arterial phase injection of IV contrast. Multiplanar reformats and MIP reconstructions were performed. Dose reduction techniques were used.   CONTRAST: 77 mL Isovue 370    FINDINGS:  ANGIOGRAM CHEST: No pulmonary embolism. Pulmonary arteries normal in caliber. Thoracic aorta normal in caliber. No aortic dissection.    HEART: Cardiac chambers within normal limits. No pericardial effusion. No coronary artery calcification.    MEDIASTINUM: Abnormally enlarged 1.7 cm right hilar node has increased in size slightly. Abnormal left axillary lymph node has also increased in size, currently 1.0 cm, previously 3.6  cm.    LUNGS AND PLEURA: Large left pleural effusion with scalloped appearance of the adjacent partially collapsed left lung suggesting complex fluid.  Aerated portions of the left lung (mildly congested. The right lung is clear.    LIMITED UPPER ABDOMEN: Prior cholecystectomy.    MUSCULOSKELETAL: Interval enlargement of left chest wall mass currently 5.3 x 7.3 x 7.3 cm, with associated destruction of the lateral aspect of the left third and fourth ribs. There is new mild erosion along the fifth rib as it courses along the   posterior margin of the mass (axial 84). New focal lytic lesion within the anterolateral aspect of the left seventh rib (axial 202).    Interval enlargement of lytic lesion with within the lower thoracic spine (sagittal 58), measuring 1.5 cm at T9. Suggestion of additional lytic lesions at T7, new, measuring 0.9 and 1.0 cm (sagittal 58, sagittal 61, respectively).      Impression    IMPRESSION:  1.  No evidence for pulmonary embolism.   2.  Large left pleural effusion which may be complex or malignant.  3.  Significant interval enlargement of previously seen left lateral chest wall mass, currently 5.3 x 7.3 x 7.3 cm, previously 6.1 x 4.5 x 4.6 cm on 08/08/2023. Associated destruction of the left third and fourth ribs has also progressed.  4.  There are new lesions within left ribs and thoracic vertebral bodies as described.

## 2023-09-23 NOTE — PLAN OF CARE
Orientation: A&Ox4  Activity: A1 w/walker  Diet/BS Checks: NPO  Tele: N/A  IV Access/Drains: R PIV SL  Pain Management: Scheduled tylenol, IV dilaudid x1 for 9/10 rib pain  Abnormal VS/Results: VSS on 4L NC ex HTN, SOB, LAKHANI  Bowel/Bladder: Continent, LBM 9/18  Skin/Wounds: None  Consults: Hem/Onc, IR  D/C Disposition: Pending improvement  Other Info: Thoracentesis planned for today.

## 2023-09-23 NOTE — PHARMACY-ADMISSION MEDICATION HISTORY
Pharmacist Admission Medication History    Admission medication history is complete. The information provided in this note is only as accurate as the sources available at the time of the update.    Medication reconciliation/reorder completed by provider prior to medication history? No    Information Source(s): Patient and CareEverywhere/SureScripts via in-person    Pertinent Information:   - Recently prescribed zolpidem but told not to take with oxycodone, and pt pain relief more important to her than trying the zolpidem so has not yet started zolpidem.    Changes made to PTA medication list:  Added: All medications  Deleted: None  Changed: None    Medication Affordability:       Allergies reviewed with patient and updates made in EHR: yes    Medication History Completed By: Griselda Trinh [facility] 9/22/2023 9:42 PM    Prior to Admission medications    Medication Sig Last Dose Taking? Auth Provider Long Term End Date   ibuprofen (ADVIL/MOTRIN) 200 MG tablet Take 400 mg by mouth 3 times daily as needed for pain . If pain not relieved with oxycodone. prn at prn Yes Unknown, Entered By History     oxyCODONE IR (ROXICODONE) 10 MG tablet Take 5 mg by mouth 3 times daily 9/22/2023 at x1 Yes Unknown, Entered By History     zolpidem (AMBIEN) 10 MG tablet Take 10 mg by mouth nightly as needed for sleep not yet started at not yet started  Unknown, Entered By History

## 2023-09-23 NOTE — ED PROVIDER NOTES
"    History     Chief Complaint:  Rib Pain       HPI   Alyson Escobar is a 63 year old female with a history of cancer who presents in the ED today due to chest pain. Patient also disclosed that a month ago she came to the ED due to chest pain and shortness of breath and found out that her breast cancer had spread to her chest wall and caused a pathologic rib fracture.  She underwent a CT-guided biopsy of the mass last week.  She currently cannot take deep breathes without severe pain that she describes as a 10/10. She also says that she has coughed up brown sputum and has a cramp on her left hip.  She also has pain in her left shoulder.  The patient denies fever, falling, abdominal pain, diarrhea, and emesis. Patient also denies blood thinners and history of blood clots.  She has been taking oxycodone for pain without relief.      Independent Historian:    Patient     Review of External Notes:  Reviewed primary care office visit from 9/1/2023 when she followed up from her ER visit after which she was found to have a chest wall mass with associated rib destruction.    Medications:    Ambien   Roxicodone     Past Medical History:    Breast cancer    Past Surgical History:    Double mastectomy      Physical Exam   Patient Vitals for the past 24 hrs:   BP Temp Temp src Pulse Resp SpO2 Height Weight   09/23/23 0101 (!) 141/78 98.9  F (37.2  C) Oral 83 20 98 % -- 100.5 kg (221 lb 9 oz)   09/22/23 1933 (!) 158/50 97.7  F (36.5  C) -- 94 18 97 % 1.676 m (5' 6\") 99.8 kg (220 lb)        Physical Exam  Vitals and nursing note reviewed.   Constitutional:       General: She is not in acute distress.     Appearance: She is ill-appearing.   HENT:      Head: Normocephalic and atraumatic.      Right Ear: External ear normal.      Left Ear: External ear normal.      Nose: Nose normal.      Mouth/Throat:      Mouth: Mucous membranes are moist.   Eyes:      Extraocular Movements: Extraocular movements intact.      " Conjunctiva/sclera: Conjunctivae normal.   Cardiovascular:      Rate and Rhythm: Normal rate and regular rhythm.      Heart sounds: No murmur heard.  Pulmonary:      Effort: Pulmonary effort is normal. Tachypnea present. No respiratory distress.      Breath sounds: Examination of the left-upper field reveals decreased breath sounds. Examination of the left-middle field reveals decreased breath sounds. Examination of the left-lower field reveals decreased breath sounds. Decreased breath sounds present. No wheezing, rhonchi or rales.   Chest:      Chest wall: Tenderness present.   Abdominal:      General: Abdomen is flat. Bowel sounds are normal. There is no distension.      Palpations: Abdomen is soft.      Tenderness: There is no abdominal tenderness. There is no guarding or rebound.   Musculoskeletal:         General: No deformity or signs of injury.      Cervical back: Normal range of motion and neck supple.   Skin:     General: Skin is warm and dry.      Findings: No rash.   Neurological:      Mental Status: She is alert and oriented to person, place, and time.   Psychiatric:         Behavior: Behavior normal.           Emergency Department Course   ECG  ECG taken at 1955, ECG read at 2003  Normal Sinus rhythm  Possible Left atrial enlargement   Septal infarct, age undetermined  Abnormal ECG   When compared with prior ECG, dated 8/8/23,  Rate 90 bpm. DC interval 116 ms. QRS duration 70 ms. QT/QTc 384/469 ms. P-R-T axes 56 39 37.    Imaging:  CT Chest Pulmonary Embolism w Contrast   Final Result   IMPRESSION:   1.  No evidence for pulmonary embolism.    2.  Large left pleural effusion which may be complex or malignant.   3.  Significant interval enlargement of previously seen left lateral chest wall mass, currently 5.3 x 7.3 x 7.3 cm, previously 6.1 x 4.5 x 4.6 cm on 08/08/2023. Associated destruction of the left third and fourth ribs has also progressed.   4.  There are new lesions within left ribs and thoracic  vertebral bodies as described.      XR Pelvis w Hip Left 1 View   Final Result   IMPRESSION: Mild degenerative arthritis both hips. No acute fracture. No discrete lytic or blastic osseous lesion. Postoperative changes of the lumbosacral spine with posterior spinal fixation hardware and interbody spacers.      XR Shoulder Left G/E 3 Views   Final Result   IMPRESSION: Mild degenerative arthritis of the acromioclavicular and glenohumeral joints. No acute fracture.       Left chest wall mass and destruction of the left lateral third and fourth ribs. Findings are better characterized and further discussed on the same day CTA chest.      IR Thoracentesis    (Results Pending)   US Thoracentesis    (Results Pending)     Report per radiology    Laboratory:  Labs Ordered and Resulted from Time of ED Arrival to Time of ED Departure   BASIC METABOLIC PANEL - Abnormal       Result Value    Sodium 140      Potassium 3.5      Chloride 97 (*)     Carbon Dioxide (CO2) 29      Anion Gap 14      Urea Nitrogen 12.2      Creatinine 0.69      Calcium 9.9      Glucose 116 (*)     GFR Estimate >90     CBC WITH PLATELETS AND DIFFERENTIAL - Abnormal    WBC Count 19.4 (*)     RBC Count 4.74      Hemoglobin 13.7      Hematocrit 43.0      MCV 91      MCH 28.9      MCHC 31.9      RDW 13.2      Platelet Count 708 (*)     % Neutrophils 80      % Lymphocytes 13      % Monocytes 6      % Eosinophils 0      % Basophils 0      % Immature Granulocytes 1      NRBCs per 100 WBC 0      Absolute Neutrophils 15.4 (*)     Absolute Lymphocytes 2.5      Absolute Monocytes 1.2      Absolute Eosinophils 0.0      Absolute Basophils 0.1      Absolute Immature Granulocytes 0.1      Absolute NRBCs 0.0     BLOOD CULTURE   BLOOD CULTURE          Emergency Department Course & Assessments:        Interventions:  Medications   ondansetron (ZOFRAN) injection 4 mg (4 mg Intravenous $Given 9/22/23 2016)   melatonin tablet 1 mg (has no administration in time range)    acetaminophen (TYLENOL) tablet 975 mg (975 mg Oral $Given 9/23/23 0203)   oxyCODONE (ROXICODONE) tablet 10 mg (has no administration in time range)   oxyCODONE (ROXICODONE) tablet 15 mg (has no administration in time range)   HYDROmorphone (PF) (DILAUDID) injection 0.3 mg (has no administration in time range)   HYDROmorphone (PF) (DILAUDID) injection 0.5 mg (has no administration in time range)   polyethylene glycol (MIRALAX) Packet 17 g (has no administration in time range)   senna-docusate (SENOKOT-S/PERICOLACE) 8.6-50 MG per tablet 1 tablet (has no administration in time range)     Or   senna-docusate (SENOKOT-S/PERICOLACE) 8.6-50 MG per tablet 2 tablet (has no administration in time range)   ondansetron (ZOFRAN ODT) ODT tab 4 mg (has no administration in time range)     Or   ondansetron (ZOFRAN) injection 4 mg (has no administration in time range)   pantoprazole (PROTONIX) EC tablet 40 mg (has no administration in time range)   ibuprofen (ADVIL/MOTRIN) tablet 400 mg (has no administration in time range)   Lidocaine (LIDOCARE) 4 % Patch 2 patch (has no administration in time range)   naloxone (NARCAN) injection 0.2 mg (has no administration in time range)     Or   naloxone (NARCAN) injection 0.4 mg (has no administration in time range)     Or   naloxone (NARCAN) injection 0.2 mg (has no administration in time range)     Or   naloxone (NARCAN) injection 0.4 mg (has no administration in time range)   HYDROmorphone (PF) (DILAUDID) injection 0.5 mg (0.5 mg Intravenous $Given 9/22/23 2340)   Saline Flush - CT (100 mLs Intravenous $Given 9/22/23 2052)   iopamidol (ISOVUE-370) solution 77 mL (77 mLs Intravenous $Given 9/22/23 2052)   cefTRIAXone (ROCEPHIN) 2 g vial to attach to  ml bag for ADULTS or NS 50 ml bag for PEDS (2 g Intravenous $New Bag 9/22/23 2216)   azithromycin (ZITHROMAX) 500 mg vial to attach to  mL bag (500 mg Intravenous $New Bag 9/22/23 2301)        Assessments:  1956 I obtained history  and examined the patient as noted above.   I rechecked the patients status and updated them on the findings.     Independent Interpretation (X-rays, CTs, rhythm strip):  None    Consultations/Discussion of Management or Tests:   I spoke with Dr. Denisse Love, of the hospitalist team, regarding the patient's history and presentation in the emergency department today.     Social Determinants of Health affecting care:  None     Disposition:  The patient was admitted to the hospital under the care of Dr. Love.     Impression & Plan    CMS Diagnoses: None    Medical Decision Makin-year-old female presenting with worsening pain and shortness of breath.  She was recently diagnosed with a chest wall mass with associated destruction of several of her ribs.  She underwent a CT-guided biopsy recently for this.  Today her pain is worsening and her oxycodone is not helping her pain.  She also reports some right-sided pain.  Work-up was pursued as noted above which included labs and a CT of her chest.  CT unfortunately does show worsening of her disease with associated large pleural effusion on the left.  We will plan to cover her with antibiotics, control her pain, and admit for further care.  She will likely need a thoracentesis in the morning.  I discussed with the hospitalist who accepted admission.      Diagnosis:    ICD-10-CM    1. Pleural effusion, left  J90       2. Intractable pain  R52       3. Chest wall mass  R22.2            Discharge Medications:  Current Discharge Medication List             Scribe Disclosure:    I, Alexis Arceo, am serving as a scribe at 8:12 PM on 2023 to document services personally performed by Francois Durham MD based on my observations and the provider's statements to me.    2023   Francois Durham MD Goodwin, Shaun M, MD  [phone]

## 2023-09-23 NOTE — ED NOTES
[facility]  ED Nurse Handoff Report    ED Chief complaint: Rib Pain      ED Diagnosis:   Final diagnoses:   Pleural effusion, left   Intractable pain   Chest wall mass       Code Status: TBD by hospitalist at admission    Allergies:   Allergies   Allergen Reactions    Dog Epithelium Allergy Skin Test      Other Reaction(s): respiratory distress    Pollen Extract Other (See Comments)    Penicillin G Swelling and Rash       Patient Story:   63 year old female with a history of cancer who presents in the ED today due to chronic cancer pain. States that a month ago she came to the ED due to shortness of breath and found out that her cancer had spread to her chest and right side of her body. She currently cannot take deep breathes without severe pain that she describes as a 12/10.     Focused Assessment:    Neuro: Alert, oriented x 4  Respiratory:diminished to absent on the left side   Cardiology:  mildly hypertensive   Gastrointestinal: soft, non tender, non distended   Genitourinary/Renal:    Musculoskeletal: moves all extremities   Skin: Intact skin   Lines: 20 right forearm    Labs Ordered and Resulted from Time of ED Arrival to Time of ED Departure   BASIC METABOLIC PANEL - Abnormal       Result Value    Sodium 140      Potassium 3.5      Chloride 97 (*)     Carbon Dioxide (CO2) 29      Anion Gap 14      Urea Nitrogen 12.2      Creatinine 0.69      Calcium 9.9      Glucose 116 (*)     GFR Estimate >90     CBC WITH PLATELETS AND DIFFERENTIAL - Abnormal    WBC Count 19.4 (*)     RBC Count 4.74      Hemoglobin 13.7      Hematocrit 43.0      MCV 91      MCH 28.9      MCHC 31.9      RDW 13.2      Platelet Count 708 (*)     % Neutrophils 80      % Lymphocytes 13      % Monocytes 6      % Eosinophils 0      % Basophils 0      % Immature Granulocytes 1      NRBCs per 100 WBC 0      Absolute Neutrophils 15.4 (*)     Absolute Lymphocytes 2.5      Absolute Monocytes 1.2      Absolute Eosinophils 0.0       Absolute Basophils 0.1      Absolute Immature Granulocytes 0.1      Absolute NRBCs 0.0     BLOOD CULTURE   BLOOD CULTURE        CT Chest Pulmonary Embolism w Contrast   Final Result   IMPRESSION:   1.  No evidence for pulmonary embolism.    2.  Large left pleural effusion which may be complex or malignant.   3.  Significant interval enlargement of previously seen left lateral chest wall mass, currently 5.3 x 7.3 x 7.3 cm, previously 6.1 x 4.5 x 4.6 cm on 08/08/2023. Associated destruction of the left third and fourth ribs has also progressed.   4.  There are new lesions within left ribs and thoracic vertebral bodies as described.      XR Pelvis w Hip Left 1 View   Final Result   IMPRESSION: Mild degenerative arthritis both hips. No acute fracture. No discrete lytic or blastic osseous lesion. Postoperative changes of the lumbosacral spine with posterior spinal fixation hardware and interbody spacers.      XR Shoulder Left G/E 3 Views   Final Result   IMPRESSION: Mild degenerative arthritis of the acromioclavicular and glenohumeral joints. No acute fracture.       Left chest wall mass and destruction of the left lateral third and fourth ribs. Findings are better characterized and further discussed on the same day CTA chest.            Treatments and/or interventions provided:      Medications   ondansetron (ZOFRAN) injection 4 mg (4 mg Intravenous $Given 9/22/23 2016)   HYDROmorphone (PF) (DILAUDID) injection 0.5 mg (0.5 mg Intravenous $Given 9/22/23 2124)   azithromycin (ZITHROMAX) 500 mg vial to attach to  mL bag (500 mg Intravenous $New Bag 9/22/23 2301)   Saline Flush - CT (100 mLs Intravenous $Given 9/22/23 2052)   iopamidol (ISOVUE-370) solution 77 mL (77 mLs Intravenous $Given 9/22/23 2052)   cefTRIAXone (ROCEPHIN) 2 g vial to attach to  ml bag for ADULTS or NS 50 ml bag for PEDS (2 g Intravenous $New Bag 9/22/23 2216)        Patient's response to treatments and/or interventions:   Resting  "comfortably    To be done/followed up on inpatient unit:    See any in-patient orders    Does this patient have any cognitive concerns?: NA    Activity level - Baseline/Home:    Independent    Activity Level - Current:     Independent    Patient's Preferred language: English     Needed?: No    Isolation: None  Infection: Not Applicable  Patient tested for COVID 19 prior to admission: NO    Bariatric?: No    Vital Signs:   Vitals:    09/22/23 1933   BP: (!) 158/50   Pulse: 94   Resp: 18   Temp: 97.7  F (36.5  C)   SpO2: 97%   Weight: 99.8 kg (220 lb)   Height: 1.676 m (5' 6\")       Cardiac Rhythm:     Was the PSS-3 completed:   Yes  What interventions are required if any?               Family Comments: none present  OBS brochure/video discussed/provided to patient/family: Yes              Name of person given brochure if not patient:              Relationship to patient:    For the majority of the shift this patient's behavior was Green.   Behavioral interventions performed were     ED NURSE PHONE NUMBER: *[phone]          "

## 2023-09-23 NOTE — PROGRESS NOTES
RECEIVING UNIT ED HANDOFF REVIEW    ED Nurse Handoff Report was reviewed by: Annie Addison RN on September 23, 2023 at 12:04 AM

## 2023-09-23 NOTE — PROGRESS NOTES
[facility]    Hospitalist Progress Note    Date of Service (when I saw the patient): 09/23/2023    Assessment & Plan   Alyson Escobar is a 63 year old female, who recently moved from [place], and was diagnosed with  metastatic breast cancer recently, presents to ER on 9/22/2023 with uncontrolled pain due to cancer.     She was diagnosed with stage III bilateral breast cancer 2 years ago in [place] and was treated with bilateral mastectomies followed by approximately 1 year of chemotherapy.       She presented to ER on 8/7/2023 with chest wall pain.  CT showed lytic destruction of Left fourth rib with associated mass measuring 6.1 x 4.5 x 4.6 cm.  She underwent biopsy on 9/14 that showed this to be metastatic carcinoma consistent with breast primary.  She has not establish care with oncology yet.     CT PE on admission showed - large left pleural effusion which could be complex or malignant.  Significant interval enlargement of left chest wall mass, now measuring 5.3 x 7.3 x 7.3 cm.  There is associated destruction of left third and fourth ribs which has progressed.  There are new lesions within the left ribs fifth and seventh.  There are new lesions in thoracic vertebral bodies at T9, T7.        Metastatic breast cancer with left chest wall mass measuring 5.3 x 7.3 x 7.3 cm, lytic lesions of left third, fourth, fifth and seventh ribs lytic lesions of T9 and T7 vertebral bodies  Large left pleural effusion-most likely malignant.  Infection not ruled out  Uncontrolled pain due to metastatic breast cancer  Leukocytosis of 19  Has been taking oxycodone 10 mg 4 times a day but pain has remained uncontrolled.  Has leukocytosis of 19 which could secondary to metastatic breast cancer but infection is not completely ruled out.  She is otherwise afebrile and does not have any significant cough or respiratory symptoms.  Large left pleural effusion noted on CT is likely malignant but again infection is  not ruled out-  -Pain control Dilaudid working better)  - Oncology consult.  - Thoracentesis today (Suspicion for infection is low.  Effusion is likely malignant. Received antibiotics in ED. Hold off for now).      Leukocytosis of 19 on admit.  Thrombocytosis, platelet of 708k  Likely secondary to metastatic cancer  - Monitor CBC.  -Oncology consult     Left hip pain  -X-ray showed mild degenerative arthritis but no acute fractures or lytic lesions.  -If continues to experience significant discomfort, will need CT for further evaluation    L shoulder pain: could be related to cancer.  - Pain control for now.          Diet:  Regular diet  DVT Prophylaxis: Pneumatic Compression Devices  Garner Catheter: Not present  Lines: None     Cardiac Monitoring: None  Code Status:  DNR/DNI         Disposition:TBD (2+days).    Jenn Verdugo MD    Interval History   Left chest wall pain and hip pain better controlled. Still with L shoulder pain. Still with sob.   No abd pain/n/v. Oriented x3.        Physical Exam   Temp: 98.9  F (37.2  C) Temp src: Oral BP: (!) 141/78 Pulse: 83   Resp: 20 SpO2: 98 % O2 Device: None (Room air)    Vitals:    09/22/23 1933 09/23/23 0101   Weight: 99.8 kg (220 lb) 100.5 kg (221 lb 9 oz)     Vital Signs with Ranges  Temp:  [97.7  F (36.5  C)-98.9  F (37.2  C)] 98.9  F (37.2  C)  Pulse:  [83-94] 83  Resp:  [18-20] 20  BP: (141-158)/(50-78) 141/78  SpO2:  [97 %-98 %] 98 %  No intake/output data recorded.    Constitutional - alert, resting in bed, appears comfortable  Head - normocephalic, atraumatic  ENT - normal eye lids and lashes, no conjunctival hyperemia, no icterus, extraocular movements are normal, normal nose, no discharge, moist oral mucosa, no ulcers or exudates, normal external ear  Neck - no thyromegaly or lymphadenopathy. Tracheal is midline  CV - regular rate and rhythm, no murmurs, no edema  Pulmonary -breath sounds diminished on left   GI - abdomen is soft, non distended, non tender,  bowel sounds are present, no organomegaly  Neurological - alert and oriented, normal speech, no focal deficits  Musculoskeletal - no joint erythema or swelling, ROM is ok        Data /meds:  Reviewed.

## 2023-09-23 NOTE — PLAN OF CARE
[id number]  Pt having moderate pain on/off throughout shift.  Mostly in left rib/upper back, some on right rib area.  Utilizing scheduled motrin/tylenol, PRN IV dilaudid and oxycodone.  Had thora this afternoon, 1200 ml removed from left side.  Some improvement in pain after thora.  Up in chair on/off today.  On 2L NC at start of shift, able to wean to RA after thora.  Pt able to ambulate in room with walker, minimal SBA.

## 2023-09-24 NOTE — PLAN OF CARE
[id number]  Pt remained on 2L NC throughout the day.  On scheduled motrin and tylenol, using IV and PO dilaudid for breakthrough pain.  Appears more comfortable this afternoon.  Pt able to be up with walker in room  independently.  Appetite is minimal.  IV SL'd.  Plan for Radiation and Palliative Consults tomorrow.

## 2023-09-24 NOTE — PROGRESS NOTES
[facility]    Hospitalist Progress Note    Date of Service (when I saw the patient): 09/24/2023    Assessment & Plan   Alyson Escobar is a 63 year old female, who recently moved from [place], and was diagnosed with  metastatic breast cancer recently, presents to ER on 9/22/2023 with uncontrolled pain due to cancer.     She was diagnosed with stage III bilateral breast cancer 2 years ago in [place] and was treated with bilateral mastectomies followed by approximately 1 year of chemotherapy.       She presented to ER on 8/7/2023 with chest wall pain.  CT showed lytic destruction of Left fourth rib with associated mass measuring 6.1 x 4.5 x 4.6 cm.  She underwent biopsy on 9/14 that showed this to be metastatic carcinoma consistent with breast primary.  She has not establish care with oncology yet.     CT PE on admission showed - large left pleural effusion which could be complex or malignant.  Significant interval enlargement of left chest wall mass, now measuring 5.3 x 7.3 x 7.3 cm.  There is associated destruction of left third and fourth ribs which has progressed.  There are new lesions within the left ribs fifth and seventh.  There are new lesions in thoracic vertebral bodies at T9, T7.        Metastatic breast cancer with left chest wall mass measuring 5.3 x 7.3 x 7.3 cm, lytic lesions of left third, fourth, fifth and seventh ribs lytic lesions of T9 and T7 vertebral bodies  Large left pleural effusion-most likely malignant.  Infection not ruled out  Uncontrolled pain due to metastatic breast cancer  Leukocytosis of 19  Has been taking oxycodone 10 mg 4 times a day but pain has remained uncontrolled.  Has leukocytosis of 19 which could secondary to metastatic breast cancer but infection is not completely ruled out.  She is otherwise afebrile and does not have any significant cough or respiratory symptoms.  Large left pleural effusion noted on CT. S/p thoracentesis with removal of 1200 mL  of clear serous pleural fluid. Per light's criteria the fluid is exudate. Neg GS/culture.  - Pain control (Dilaudid working better).  - Fluid pathology/culture.  - Oncology consulted.  - Radiation onc consulted for palliative radiation.  - Palliative service consulted for pain management and GOC.      Leukocytosis of 19 on admit.  Thrombocytosis, platelet of 708k  Likely secondary to metastatic cancer  - Monitor CBC.  - Oncology consulted.     Left hip pain. None this am. X-ray showed mild degenerative arthritis but no acute fractures or lytic lesions.  L shoulder pain: could be related to cancer. None this am.  R sided pleuritic chest pain. Possibly due to cancer. Neg CT for PE on admit.  - Pain control.  - D dimer (might rpt CT if elevated).          Diet:  Regular diet  DVT Prophylaxis: Pneumatic Compression Devices  Garner Catheter: Not present  Lines: None     Cardiac Monitoring: None  Code Status:  DNR/DNI         Disposition:TBD (2+days).    Awil GARRICK Verdugo MD    Interval History   Left chest wall pain and L shoulder/hip pain well controlled controlled. Now has R sided chest pain. Pleuritic. Still with sob.   No abd pain/n/v. Oriented x3.        Physical Exam   Temp: 98  F (36.7  C) Temp src: Oral BP: 128/63 Pulse: 82   Resp: 18 SpO2: 93 % O2 Device: Nasal cannula Oxygen Delivery: 1 LPM  Vitals:    09/23/23 0101 09/23/23 0700 09/24/23 0530   Weight: 100.5 kg (221 lb 9 oz) 98.8 kg (217 lb 14.4 oz) 97.5 kg (214 lb 14.4 oz)     Vital Signs with Ranges  Temp:  [97.8  F (36.6  C)-98  F (36.7  C)] 98  F (36.7  C)  Pulse:  [65-82] 82  Resp:  [18-19] 18  BP: (111-128)/(54-79) 128/63  SpO2:  [88 %-95 %] 93 %  I/O last 3 completed shifts:  In: 780 [P.O.:780]  Out: -     Constitutional - alert, resting in bed, appears comfortable  Head - normocephalic, atraumatic  ENT - normal eye lids and lashes, no conjunctival hyperemia, no icterus, extraocular movements are normal, normal nose, no discharge, moist oral mucosa, no  ulcers or exudates, normal external ear  Neck - no thyromegaly or lymphadenopathy. Tracheal is midline  CV - regular rate and rhythm, no murmurs, no edema  Pulmonary -breath sounds diminished on left   GI - abdomen is soft, non distended, non tender, bowel sounds are present, no organomegaly  Neurological - alert and oriented, normal speech, no focal deficits  Musculoskeletal - no joint erythema or swelling, ROM is ok        Data /meds:  Reviewed.

## 2023-09-24 NOTE — PLAN OF CARE
[id number]    Orientation: A&Ox4  Activity: SBA w/walker  Diet/BS Checks: Regular  Tele: N/A  IV Access/Drains: R PIV SL  Pain Management: Scheduled tylenol, ibuprofen, IV dilaudid x3, and lidocaine patches to R back and R abd.  Abnormal VS/Results: VSS on 1L NC ex LAKHANI  Bowel/Bladder: Continent, LBM 9/18  Skin/Wounds: Thora site on L back - CDI.  Consults: Hem/Onc, IR  D/C Disposition: Pending improvement  Other Info: PRN zofran x1 given for nausea - effective. BC - no growth.

## 2023-09-25 NOTE — PROGRESS NOTES
Hematology-Oncology Follow-up Note  [facility]     Today's Date: 09/25/23  Date of Admission:  9/22/2023  Reason for Consult: metastatic breast cancer    ASSESSMENT:  Alyson Escobar is a 63 year old female with recurrent metastatic breast cancer admitted with severe pain and dyspnea.     PLAN:   - JAY consult pending  - Pain control  - Thoracentesis PRN  - Consider hospice services  - We will follow-up intermittently     Tiki Candelario PA-C (cell: [phone])    # Metastatic Breast Cancer  # Pleural Effusions  # Chest Wall mass  - History of breast cancer now with recurrent metastatic disease, triple negative, with pleural, bone, fred and left chest soft tissue mass  - She was admitted with significant pain  - She is status post thoracentesis, cytology pending  - JAY consult pending for consideration of palliative radiation  - Palliative care following - patient would like to proceed with hospice following radiation treatment; having had breast cancer and undergone treatment, she is not interested in any form of treatment     INTERIM HISTORY:  Seen bedside. Patient continues to have discomfort in left chest, especially with deep inspiration and cough. Pain medications increased today. Breathing is overall better since thoracentesis, but still difficult and requiring supplemental oxygen for comfort. She met with Dr. Mathur today who will obtain CT imaging for consideration of palliative radiation.     HISTORY OF PRESENT ILLNESS:  Previously treatment in [place]. In September 2020 she was noted to have right-sided multicentric cIIIB grade 3, IDCa ([id number], ER/LA pos, Her2 neg, Ki67 60%), with positive bulky adenopathy; she was also noted to have left - multicentric, c0 (Tis (high-grade DCIS), ER/ LA pos)      She started neoadjuvant Taxol followed by dose dense AC which she completed in March 2021. Initially surgery was delayed due to insurance issues and she was started on Femara.  6/9/2021.   She had bilateral simple mastectomy with right axillary sentinel lymph node biopsy.  Final pathology did not show any residual malignancy.     She discontinued Femara after partial treatment (few months) due to personal life stressors.    She moved to [place] just 3 months ago.    She was seen in the ED 8/7 with chest pain. Imaging showed left lateral 4th rib fracture with lytic destruction, pleural based soft tissue density at 7.0 cm. She followed up outpatient. A repeat CT 9/7 showed multiple areas of left pleural nodularity, new small left pleural effusion, sub-centimeter low right paratracheal and subcentimeter left internal mammary chain lymph nodes, 3 cm right hilar lymph node; there is increase in the size of heterogeneously enhancing mass in the left chest wall associated with osseous destruction of left third and fourth ribs - Mass now measures 7 x 5 cm while previously it was 5.5 x 3.3 cm, there is central necrosis, there is increasing contagious lobulated pleural nodularity anteriorly and inferiorly as well as along the left mediastinum.    She underwent outpatient left chest wall mass biopsy on 9/14 at . Pathology was consistent with metastatic carcinoma from breast primary, ER/MA-, HER2/swati 1+.     She now presented with increasing uncontrollable pain including chest wall pain.  She is also having pain in the left shoulder and left hip.  She mentions that the pain has been going on since the spring 2023.       Since admission, CT chest PE protocol on 9/22/2022 showed no evidence of PE but increase in the size of the left pleural effusion which is now large and significant interval enlargement of previously seen left lateral chest wall mass which now measures 5.3 x 7.3 x 7.3 cm.  There is associated destruction of the left third and fourth ribs which has also progressed. There is new mild erosion along the fifth rib as it courses along the posterior margin of the mass. New focal lytic lesion within  "the anterolateral aspect of the left seventh rib. Interval enlargement of lytic lesion with within the lower thoracic spine , measuring 1.5 cm at T9. Suggestion of additional lytic lesions at T7, new, measuring 0.9 and 1.0 cm.    She underwent left thoracentesis 9/23, fluid studies pending.    JAY consult is pending for consideration of palliative radiation.     Patient has decided to forgo medical disease directed therapy such as chemotherapy and would like to proceed with a hospice approach following potential radiation.      MEDICATIONS:  Reviewed     PHYSICAL EXAM:  Vital signs:  Temp: 98.2  F (36.8  C) Temp src: Axillary BP: (!) 143/71 Pulse: 61   Resp: 16 SpO2: 97 % O2 Device: Nasal cannula Oxygen Delivery: 2 LPM Height: 167.6 cm (5' 6\") Weight: 97.5 kg (214 lb 15.2 oz)  Estimated body mass index is 34.69 kg/m  as calculated from the following:    Height as of this encounter: 1.676 m (5' 6\").    Weight as of this encounter: 97.5 kg (214 lb 15.2 oz).    General: Somewhat uncomfortable appearing.  Skin: No jaundice. Some pallor.  Eyes: Anicteric.   Lungs: Dyspneic with conversation. Pain with cough and deep inspiration.   Abdomen: Non-distended.  Mental: Calm, cooperative, appropriate. Mood euthymic.  Neuro: Cranial nerves and coordination grossly intact. Alert and oriented x 3.    LABS:  Recent Labs   Lab Test 09/25/23  0852      POTASSIUM 4.5   CHLORIDE 100   CO2 31*   ANIONGAP 8   BUN 16.1   CR 0.71   *   ANDRY 9.3     Recent Labs   Lab Test 09/25/23  0852 09/24/23  0733 09/23/23  0705 09/22/23 2011 08/07/23 2101   WBC 15.2* 18.3*   < > 19.4* 14.0*   HGB 11.5* 11.8   < > 13.7 14.5   * 642*   < > 708* 518*   MCV 92 92   < > 91 92   NEUTROPHIL  --   --   --  80 64    < > = values in this interval not displayed.     Recent Labs   Lab Test 09/23/23  0705 08/07/23  2101   BILITOTAL  --  0.3   ALKPHOS  --  132*   ALT  --  11   AST  --  15   ALBUMIN  --  4.2     --        Tiki Candelario" LUCIA HERNANDEZ [facility]   [phone]

## 2023-09-25 NOTE — CONSULTS
Palliative Care Consultation Note  [facility]      Patient: Alyson Escobar  Date of Admission:  9/22/2023    Requesting Clinician / Team:     Jenn Verdugo MD     Reason for consult: Pain management       Recommendations & Counseling     GOALS OF CARE:   DNR/DNI, does not want chemotherapy, ok with radiation for pain control- would encourage a short course focused for pain control if possible, and home with hospice services.  Would appreciate unit  support with disposition planning    MEDICAL MANAGEMENT:   #Pain, cancer related pain   Changed ibuprofen to 600 mg TID  Changed hydromorphone to 2-4 mg q4h PRN  Added tessalon pearls PRN for cough  Added MS Contin 15 mg at bedtime  Changed hydromorphone 0.5-1 mg IV q2h PRN     #Constipation,opioid induced constipation  Increased miralax to BID  Scheduled senna 1-2 tablets BID  If ineffective, tomorrow (9/26) would give dose of methylnaltrexone 12 mg subcutaneous once      Palliative Care will continue to follow. Thank you for the consult and allowing us to aid in the care of Alyson Escobar.    These recommendations have been discussed with primary team via daria.    TOY Duran CNS  Securely message with [employer] (more info)  Text page via [facility] Paging/Directory       Assessment      Alyson Escobar is a 63 year old female with a past medical history of breast cancer s/p bilateral mastectomy who presented on 9/22 with uncontrolled pain which was found to be due to her cancer. She was recently in the ER for this pain as well and had a CT which showed multiple rib mets and throacic mets.       Today, the patient was seen for:  Metastatic breast cancer  Cancer related pain  Opioid induced constipation  Uncontrolled acute on chronic pain    Palliative Care Summary:   Met with patient.   Provided a brief introduction to our team for pain control. Due to increased pain was limited in introduction. Focused on pain to  start. She stated she wants to be out of her pain and alluded to her terminal illness that there is nothing to do.    Prognosis, Goals, & Planning:    Functional Status just prior to this current hospitalization:  ECOGNot assessed    Prognosis, Goals, and/or Advance Care Planning:  We discussed general treatment options (full/restorative, selective/conservatives, and comfort only/hospice). We then discussed how these specifically apply to Alyson.  Based on this discussion, Alyson has decided to pursue radiation for pain control and discharge home with hospice. Discussed with her today her goals which are to not do chemotherapy, she had been through it before and does not want treatment. We discussed her life expectancy with such a condition and it being measured in weeks-months. We reviewed hospice care being a resource for her. We discussed limits of pain medications and side effects that can occur, mainly sleepiness/sedation. She states she is ok to take pain medications even if she become more sleepy. We reviewed hospice services at home and their goals and resources.  She does feel that radiation could be good for her and still wants to do that. We discussed the timing of hospice resources being planned out with consideration of that goal. She very much affirms she wishes to focus on quality over quantity at this time.    Code Status was addressed today:   No she is DNR/DNI in the chart given prior discussions. This aligns with her overall goals of care.    Patient's decision making preferences: independently        Patient has decision-making capacity today for complex decisions:Intact            Coping, Meaning, & Spirituality:   Mood, coping, and/or meaning in the context of serious illness were addressed today: Yes despite pain being poorly controlled, she seems to be coping well with the situation, eager to learn next steps.    Social:   Living situation:lives with family  Important  "relationships/caregivers:her \"niece\" who she lives with which is actually her daughters good friend. Her daughter  at 33 years old.    Medications:  I have reviewed this patient's medication profile and medications from this hospitalization. Notable medications:  Acetaminophen 975 mg TID  Ibuprofen 400 mg TID  Lidocaine patches  Protonix 40 mg daily  Miralax daily  Hydromorphone IV PRN- 1.5 mg over last 24 hours  Hydromorphone PO PRN- 6 mg over last 24 hours  Senna BID PRN- x1    ROS:  Comprehensive ROS is reviewed and is negative except as here & per HPI:     Physical Exam   Vital Signs with Ranges  Temp:  [97.6  F (36.4  C)-98.2  F (36.8  C)] 98.2  F (36.8  C)  Pulse:  [61-89] 61  Resp:  [16-18] 16  BP: (119-143)/(62-71) 143/71  SpO2:  [95 %-97 %] 97 %  214 lbs 15.18 oz    PHYSICAL EXAM:  Constitutional: alert and mild distress   Respiratory: negative  Psychiatric: mentation appears normal  Abdomen: negative  NEURO: negative  SKIN: no suspicious lesions or rashes  JOINT/EXTREMITIES: extremities normal- no gross deformities noted    Data reviewed:  Recent Labs   Lab 23  0852 23  0733 23  0705 23   WBC 15.2* 18.3* 18.3* 19.4*   HGB 11.5* 11.8 12.1 13.7   MCV 92 92 93 91   * 642* 658* 708*    138 137 140   POTASSIUM 4.5 4.4 3.7 3.5   CHLORIDE 100 99 96* 97*   CO2 31* 28 30* 29   BUN 16.1 17.2 14.6 12.2   CR 0.71 0.65 0.77 0.69   ANIONGAP 8 11 11 14   ANDRY 9.3 9.2 9.5 9.9   * 117* 105* 116*   PROTTOTAL  --   --   --  8.1       Recent Results (from the past 24 hour(s))   CT Chest Pulmonary Embolism w Contrast    Narrative    EXAM: CT CHEST PULMONARY EMBOLISM W CONTRAST  LOCATION: [facility]  DATE: 2023    INDICATION: new pleurtic chest pain with elevated D dimer  COMPARISON: 2023  TECHNIQUE: CT chest pulmonary angiogram during arterial phase injection of IV contrast. Multiplanar reformats and MIP reconstructions were performed. " Dose reduction techniques were used.   CONTRAST: 76mL Isovue 370    FINDINGS:  ANGIOGRAM CHEST: Pulmonary arteries are normal caliber and negative for pulmonary emboli. Thoracic aorta is not well opacified and is  indeterminate for dissection. No CT evidence of right heart strain.    LUNGS AND PLEURA: Persistent but decreased size of loculated left pleural effusion. Left pleural thickening. Atelectasis left lower lobe. Subsegmental atelectasis right lung base.     MEDIASTINUM/AXILLAE: 1.8 cm right hilar node again seen.    CORONARY ARTERY CALCIFICATION: No significant visualized.    UPPER ABDOMEN: Cholecystectomy. Thickening of the adrenal glands.    MUSCULOSKELETAL: Left chest wall mass 7.5 x 5.3 x 7.3 cm similar to previous. This structure and lateral aspects of ribs 3 and 4. Erosive change along rib 5. Lytic lesion lateral rib 7 and lytic lesions within the thoracic spine.      Impression    IMPRESSION:  1.  No pulmonary embolism.  2.  Decreased size of loculated left effusion.  3.  Left lateral chest wall mass similar.  4.  Lesions within left ribs and thoracic spine again seen.       Medical Decision Making       75 MINUTES SPENT BY ME on the date of service doing chart review, history, exam, documentation & further activities per the note.

## 2023-09-25 NOTE — PLAN OF CARE
Vitals stable on 2L O2. A&O, independent in room. Pain in R back/ribcage area managed w/ IV Dilaudid and increased dose of PO Dilaudid. Fair appetite. Constipated- bowel meds increased. Palliative ad Rad Onc following- plan for radiation simulation this afternoon.

## 2023-09-25 NOTE — CONSULTS
RADIATION ONCOLOGY CONSULTATION    History of present illness     Alyson Escobar is a 63 year old woman with metastatic breast cancer who I saw in consultation for consideration of palliative radiotherapy for her painful metastases.     Her brief oncologic history is as follows:  September 2020: Diagnosed with stage IIIB (cT4b, cN1, cM0, grade 3, ER+, AK+, HER2-) right multicentric invasive ductal carcinoma and stage 0 (cTis, grade 3, ER+, AK+) left multicentric DCIS.  March 2021: Completed neoadjuvant AC-T.  June 9, 2021: Bilateral simple mastectomy with right axillary sentinel lymph node biopsy. Final pathology showed no residual malignancy. Patient took adjuvant endocrine therapy for a few months but discontinued. No adjuvant radiotherapy.  August 8, 2023: CT demonstrates large left chest wall mass with osseous destruction of left 3rd and 4th ribs with lobulated pleural nodularity. Few scattered hypoattenuating leions in the liver. Right anteiror abdominal wall dermal nodule. Lytic lesions at T7 and T9 vertebral bodies and left iliac.  September 7, 2023: CT redemonstrates findings from above with new left pleural effusion and increase in size of left chest wall mass.  September 14, 2023: CT guided left chest wall biopsy demonstrates carcinoma consistent with breast primary.  September 24, 2023: Presented to ED with uncontrollable chest wall pain, left shoulder, and left hip.    Subjective  In the hospital today, the patient endorses her pain has improved since adjusting her pain medications in the hospital. The pain started in the spring of 2023 and has worsened since that time. Her most painful spot is the chest wall. She endorses some dyspnea, improved since undergoing thoracentesis. She is not able to raise her arms above her head.    Review of systems  Review of systems as noted in HPI.     Past medical history  Pertinent past medical history, past surgical history, medications, allergies, social history, and  family history were reviewed. Pertinent past medical history includes degenerative disc disease of the spine, fibromyalgia, anxiety, rheumatoid arthritis. Medications include acetaminophen, hydromorphone, ibuprofen, lidocaine patch, melatonin, ondansetron, oxycodone, pantoprazole, Miralax, senna-docusate. The patient has no implantable cardiac devices. Family history is significant for pancreatic cancer in her mother and unknown cancer in her father. Social history is significant for active smoker, generally 1 ppd but down to 3 cigarettes daily; lives with her friend and is driven to appointments by her niece.    Prior history of radiation  None.    Objective  Physical exam  ECO  Constitutional: Lying in hospital bed, in no acute distress.  MSK: No tenderness to palpation of her spine. Unable to fully abduct upper extremities secondary to pain.    Assessment/plan  #1 Stage IIIB (cT4b, cN1, cM0, grade 3, ER+, LA+, HER2-) right multicentric invasive ductal carcinoma and stage 0 (cTis, grade 3, ER+, LA+) left multicentric DCIS s/p induction AC-T and bilateral mastectomy, now with widespread metastases   #2 Secondary malignant neoplasm of bone    Alyson Escobar is a 63 year old with widely metastatic breast cancer who is hospitalized and seen in consultation with Radiation Oncology for a discussion of palliative radiotherapy.     I have reviewed the pertinent history, laboratory, and imaging studies. The patient's large left chest wall mass appears to be causing her the most pain and contributing primarily to her hospitalization. We discussed that radiotherapy could be used to palliate her pain without the goal of cure. I reviewed that the effects of radiotherapy may take 2-3 weeks, and it would be reasonable to start treatment as soon as feasible.     We discussed the logistics of radiation simulation, planning, and daily treatment. We also reviewed the acute and late toxicities associated with treatment including  fatigue, pain flare, pneumonitis.  The patient displayed understanding of the risks and benefits.     We will plan for CT simulation later today, September 25, 2023, and initiation of treatment approximately tomorrow. We discussed two fractionation regimens, either 3000 cGy in 10 fractions, which may provide more durability and better spare the brachial plexus, or 2000 cGy in 5 fractions. The patient will inform us of her treatment decision after speaking with her niece and when she arrives in the department later this afternoon.    All questions were answered to the patient's satisfaction.    Edi Jensen   1:44 pm  September 25, 2023

## 2023-09-25 NOTE — PLAN OF CARE
[id number]    Orientation: A&Ox4    Activity: SBA w/walker    Diet/BS Checks: Regular    Tele: N/A    IV Access/Drains: R PIV SL    Pain Management: Scheduled tylenol, ibuprofen, and IV dilaudid x2 for 7-10/10 abdominal and back pain.    Abnormal VS/Results: VSS on 2L NC ex LAKHANI    Bowel/Bladder: Continent, no BM overnight    Skin/Wounds:     Consults: Hem/Onc, IR    D/C Disposition: Pending improvement    Other Info:

## 2023-09-26 NOTE — PLAN OF CARE
0700--1530 Nursing shift Note  Stage 4 Breast cancer patient with bone mets to ribs and spine causing pain R&L lateral rib cages, upper L Back , L shoulder and Lupper arm.  Also pt is very Constipated states hasn't had a BM for about a week despite being on Senna and Miralax. Abdomen obese soft with +BS  Palliative care was at bedside aware of above. She increased the daily MS Contin to BID.  A 4mg dose of prn of Dilaudid given x1 helped lower pain from 8 to a tolerable(goal) range of 5-6    For her bowels, Palliative per my suggestions changed the scheduled Senna to Senokot-S and added a dulcolax supp to be given. (And If there was no BM from supp then to follow with fleets enema). Pt however preferred to wait on receiving those bowel interventions till after her Radiation CT assimilation this evening.  Palliative also ordered scheduled Voltaren gel to sites of pain but writer didn't want the med given until the sites of radiation tx were known, and until it was approved by radiology staff and verified that it's placement wasn't going to interfere with the radiation treatment.  Gets up with assist of 1/GB/walker. Remains Fall Risk. On reg diet. Appetite poor.   Continue to monitor

## 2023-09-26 NOTE — PROGRESS NOTES
[facility]    Medicine Progress Note - Hospitalist Service    Date of Admission:  9/22/2023    Interval History   Patient started on radiation treatments.  We will have more this week.  Seen by palliative care with start of MS Contin.    Assessment & Plan   Alyson Escobar is a 63 year old female, who recently moved from [place], and was diagnosed with  metastatic breast cancer recently, presents to ER on 9/22/2023 with uncontrolled pain due to cancer.     She was diagnosed with stage III bilateral breast cancer 2 years ago in [place] and was treated with bilateral mastectomies followed by approximately 1 year of chemotherapy.       She presented to ER on 8/7/2023 with chest wall pain.  CT showed lytic destruction of Left fourth rib with associated mass measuring 6.1 x 4.5 x 4.6 cm.  She underwent biopsy on 9/14 that showed this to be metastatic carcinoma consistent with breast primary.  She has not establish care with oncology yet.     CT PE on admission showed - large left pleural effusion which could be complex or malignant.  Significant interval enlargement of left chest wall mass, now measuring 5.3 x 7.3 x 7.3 cm.  There is associated destruction of left third and fourth ribs which has progressed.  There are new lesions within the left ribs fifth and seventh.  There are new lesions in thoracic vertebral bodies at T9, T7.        Metastatic breast cancer with left chest wall mass measuring 5.3 x 7.3 x 7.3 cm, lytic lesions of left third, fourth, fifth and seventh ribs lytic lesions of T9 and T7 vertebral bodies  Large left pleural effusion-most likely malignant.  Infection not ruled out  Uncontrolled pain due to metastatic breast cancer  Leukocytosis of 19  Has been taking oxycodone 10 mg 4 times a day but pain has remained uncontrolled.  Has leukocytosis of 19 which could secondary to metastatic breast cancer but infection is not completely ruled out.  She is otherwise afebrile and does  not have any significant cough or respiratory symptoms.  Large left pleural effusion noted on CT. S/p thoracentesis with removal of 1200 mL of clear serous pleural fluid. Per light's criteria the fluid is exudate. Neg GS/culture.  Fluid pathology/culture (9/23) with fluid  no growth   Oncology consulted.  Radiation onc consulted for palliative radiation.  Palliative service consulted for pain management and GOC.   Ibuprofen to 600 mg 3 times daily  Morphine 2 to 4 mg every 4 hours as needed  Added Tessalon Perles as needed for cough  MS Contin 15 mg po BID      Leukocytosis of 19 on admit.  Thrombocytosis, platelet of 708k  Likely secondary to metastatic cancer  WBC 18-19,000 on admit  9/25 white count 15  No overt sign of infection     Left hip pain. None this am.   X-ray showed mild degenerative arthritis but no acute fractures or lytic lesions.  L shoulder pain:   could be related to cancer. None this am.  R sided pleuritic chest pain. Possibly due to cancer. Neg CT for PE on admit. And repeated   Pain control..  9/24 CT PE study repeated with no PE.  Decreased size of loculated left effusion, left lateral chest wall mass, lesions within left ribs and thoracic spine again seen     Radiation therapy:  Patient seen by radiation oncology.  Underwent CT simulation 9/25 9/26 started on radiation treatment  Patient will have more treatments this week.  She wants to complete her radiation prior to starting hospice: Would not be covered by hospice     Constipation   Started on bowel softeners  Now having bowel movement    Diet:  Regular diet  DVT Prophylaxis: Pneumatic Compression Devices  Garner Catheter: Not present  Lines: None     Cardiac Monitoring: None  Code Status:  DNR/DNI       Diet: Regular Diet Adult  Snacks/Supplements Adult: Other; 2pm: chocolate Ensure  (RD); Between Meals    DVT Prophylaxis: Pneumatic Compression Devices  Garner Catheter: Not present  Lines: None     Cardiac Monitoring: None  Code Status:  "No CPR- Do NOT Intubate      Clinically Significant Risk Factors                         # Obesity: Estimated body mass index is 35.23 kg/m  as calculated from the following:    Height as of this encounter: 1.676 m (5' 6\").    Weight as of this encounter: 99 kg (218 lb 4.8 oz).  , PRESENT ON ADMISSION    # Moderate Malnutrition: based on nutrition assessment, PRESENT ON ADMISSION          Disposition Plan      Expected Discharge Date: 09/28/2023      Destination: home with help/services            BRET LARA MD  Hospitalist Service  [facility]  Securely message with [employer] (more info)  Text page via [facility] Paging/Directory   ______________________________________________________________________    Physical Exam   Vital Signs: Temp: 97.8  F (36.6  C) Temp src: Oral BP: 134/65 Pulse: 58   Resp: 16 SpO2: 98 % O2 Device: Nasal cannula Oxygen Delivery: 2 LPM  Weight: 218 lbs 4.8 oz    General Appearance: Awake and alert  Respiratory: Lungs clear to auscultation bilaterally.  Cardiovascular: Regular rate and rhythm without gallop or rub  GI: Positive bowel sounds soft rebound guarding tenderness  Skin: no swelling or redness       Medical Decision Making       45 MINUTES SPENT BY ME on the date of service doing chart review, history, exam, documentation & further activities per the note.      Data         Imaging results reviewed over the past 24 hrs:   No results found for this or any previous visit (from the past 24 hour(s)).  "

## 2023-09-26 NOTE — PROGRESS NOTES
Radiation therapy treatment #1 of 5. Pt received a daily dose of 400 cGy with 10 MV photon radiation to the left chest wall for a total dose of 400 cGy. Plan for 4 more radiation treatments.

## 2023-09-26 NOTE — PLAN OF CARE
Pt is AOx4, pain in L ribs area, managed with current regimen, VSS on 2L, lungs diminished, infrequent productive cough, independent in room, poor appetite, voiding in BR, pending radiation in morning.

## 2023-09-26 NOTE — PROGRESS NOTES
[facility]    Medicine Progress Note - Hospitalist Service    Date of Admission:  9/22/2023    Interval History   Patient was seen by radiation oncology.   Patient okay with starting radiation treatments.  Had CT simulation done today.  Initiation of treatment tomorrow.  Decision to either do 5 or 10 treatments based on how much radiation patient would like to try.  She is willing to undergo radiation for palliation and pain control but then does not want any further chemotherapy.  Considering hospice after radiation.  She had a thoracentesis done as well for symptomatic treatment      Assessment & Plan   Alyson Escobar is a 63 year old female, who recently moved from [place], and was diagnosed with  metastatic breast cancer recently, presents to ER on 9/22/2023 with uncontrolled pain due to cancer.     She was diagnosed with stage III bilateral breast cancer 2 years ago in [place] and was treated with bilateral mastectomies followed by approximately 1 year of chemotherapy.       She presented to ER on 8/7/2023 with chest wall pain.  CT showed lytic destruction of Left fourth rib with associated mass measuring 6.1 x 4.5 x 4.6 cm.  She underwent biopsy on 9/14 that showed this to be metastatic carcinoma consistent with breast primary.  She has not establish care with oncology yet.     CT PE on admission showed - large left pleural effusion which could be complex or malignant.  Significant interval enlargement of left chest wall mass, now measuring 5.3 x 7.3 x 7.3 cm.  There is associated destruction of left third and fourth ribs which has progressed.  There are new lesions within the left ribs fifth and seventh.  There are new lesions in thoracic vertebral bodies at T9, T7.        Metastatic breast cancer with left chest wall mass measuring 5.3 x 7.3 x 7.3 cm, lytic lesions of left third, fourth, fifth and seventh ribs lytic lesions of T9 and T7 vertebral bodies  Large left pleural  effusion-most likely malignant.  Infection not ruled out  Uncontrolled pain due to metastatic breast cancer  Leukocytosis of 19  Has been taking oxycodone 10 mg 4 times a day but pain has remained uncontrolled.  Has leukocytosis of 19 which could secondary to metastatic breast cancer but infection is not completely ruled out.  She is otherwise afebrile and does not have any significant cough or respiratory symptoms.  Large left pleural effusion noted on CT. S/p thoracentesis with removal of 1200 mL of clear serous pleural fluid. Per light's criteria the fluid is exudate. Neg GS/culture.  Pain control (Dilaudid working better).  Fluid pathology/culture (9/23) with fluid  no growth   Oncology consulted.  Radiation onc consulted for palliative radiation.  Palliative service consulted for pain management and GOC.   Ibuprofen to 600 mg 3 times daily  Morphine 2 to 4 mg every 4 hours as needed  Added Tessalon Perles as needed for cough  MS Contin 15 mg at bedtime     Leukocytosis of 19 on admit.  Thrombocytosis, platelet of 708k  Likely secondary to metastatic cancer  WBC 18-19,000 on admit  9/25 white count 15  No overt sign of infection     Left hip pain. None this am.   X-ray showed mild degenerative arthritis but no acute fractures or lytic lesions.  L shoulder pain:   could be related to cancer. None this am.  R sided pleuritic chest pain. Possibly due to cancer. Neg CT for PE on admit. And repeated   Pain control..  9/24 CT PE study repeated with no PE.  Decreased size of loculated left effusion, left lateral chest wall mass, lesions within left ribs and thoracic spine again seen     Radiation therapy:  Patient seen by radiation oncology.  Underwent CT simulation today.  Will initiate radiation treatments tomorrow.  Will have follow-up outpatient.  She is okay for having radiation treatment for symptomatic management.  Does not want to pursue further chemotherapy and may enroll in hospice in the near future    "  Constipation   Started on bowel softeners    Diet:  Regular diet  DVT Prophylaxis: Pneumatic Compression Devices  Garner Catheter: Not present  Lines: None     Cardiac Monitoring: None  Code Status:  DNR/DNI       Diet: Regular Diet Adult  Snacks/Supplements Adult: Other; 2pm: chocolate Ensure  (RD); Between Meals    DVT Prophylaxis: Pneumatic Compression Devices  Garner Catheter: Not present  Lines: None     Cardiac Monitoring: None  Code Status: No CPR- Do NOT Intubate      Clinically Significant Risk Factors                         # Obesity: Estimated body mass index is 34.69 kg/m  as calculated from the following:    Height as of this encounter: 1.676 m (5' 6\").    Weight as of this encounter: 97.5 kg (214 lb 15.2 oz)., PRESENT ON ADMISSION  # Moderate Malnutrition: based on nutrition assessment, PRESENT ON ADMISSION          Disposition Plan      Expected Discharge Date: 09/27/2023                  BRET LARA MD  Hospitalist Service  [facility]  Securely message with [employer] (more info)  Text page via [employer] Paging/Directory   ______________________________________________________________________    Physical Exam   Vital Signs: Temp: 98.2  F (36.8  C) Temp src: Oral BP: 136/73 Pulse: 63   Resp: 16 SpO2: 96 % O2 Device: Nasal cannula Oxygen Delivery: 2 LPM  Weight: 214 lbs 15.18 oz    General Appearance: Awake and alert  Respiratory: Lungs clear to auscultation bilaterally.  Cardiovascular: Regular rate and rhythm without gallop or rub  GI: Positive bowel sounds soft rebound guarding tenderness  Skin: no swelling or redness       Medical Decision Making       45 MINUTES SPENT BY ME on the date of service doing chart review, history, exam, documentation & further activities per the note.      Data     I have personally reviewed the following data over the past 24 hrs:    15.2 (H)  \   11.5 (L)   / 603 (H)     139 100 16.1 /  143 (H)   4.5 31 (H) 0.71 \       Imaging results reviewed " over the past 24 hrs:   No results found for this or any previous visit (from the past 24 hour(s)).

## 2023-09-26 NOTE — CONSULTS
Care Management Initial Consult    General Information  Assessment completed with: Patient,    Type of CM/SW Visit: Initial Assessment    Primary Care Provider verified and updated as needed: Yes   Readmission within the last 30 days: no previous admission in last 30 days      Reason for Consult: discharge planning  Advance Care Planning:            Communication Assessment  Patient's communication style: spoken language (English or Bilingual)    Hearing Difficulty or Deaf: no   Wear Glasses or Blind: yes    Cognitive  Cognitive/Neuro/Behavioral: WDL  Level of Consciousness: alert  Arousal Level: opens eyes spontaneously  Orientation: oriented x 4  Mood/Behavior: calm, cooperative  Best Language: 0 - No aphasia  Speech: clear, spontaneous, logical    Living Environment:   People in home: other (see comments) (niece)     Current living Arrangements: apartment      Able to return to prior arrangements: yes       Family/Social Support:  Care provided by: self  Provides care for: no one  Marital Status: Single  Other (specify) (niece)          Description of Support System: Supportive, Involved    Support Assessment: Adequate family and caregiver support, Adequate social supports    Current Resources:   Patient receiving home care services: No     Community Resources:    Equipment currently used at home: none  Supplies currently used at home:      Employment/Financial:  Employment Status:          Financial Concerns:             Does the patient's insurance plan have a 3 day qualifying hospital stay waiver?  No    Lifestyle & Psychosocial Needs:  Social Determinants of Health     Food Insecurity: Not on file   Depression: Not on file   Housing Stability: Not on file   Tobacco Use: Not on file   Financial Resource Strain: Not on file   Alcohol Use: Not on file   Transportation Needs: Not on file   Physical Activity: Not on file   Interpersonal Safety: Not on file   Stress: Not on file   Social Connections: Not on file        Functional Status:  Prior to admission patient needed assistance:              Mental Health Status:          Chemical Dependency Status:                Values/Beliefs:  Spiritual, Cultural Beliefs, Uatsdin Practices, Values that affect care:                 Additional Information:  Writer received consult for discharge planning. Writer spoke with Nat with Palliative care who states patient is planning on transitioning to hospice after her last palliative radiation treatment on Monday. Writer met with patient and introduced self and role. Patient states she lives with her niece Aparna in an apartment. Aparna helps her around the house and is able to be her caregiver. Writer discussed hospice and what it covers. Patient states that she is hoping hospice can help her with her pain. Writer explained that normally hospice cannot sign on until after her palliative radiation is complete but potentially we could look at getting her home and then hospice signing on Monday evening or Tuesday morning. Patient did seem apprehensive regarding the discharge plan and if she went home before hospice would start. Patient is worried about her pain control if hospice is not on board for when she is discharged from the hospital. Writer let patient know we could reach out to agencies to see if they would be able to see her before she is done with Palliative Radiation and let her know. Patient is on O2 and would need O2 delivered before discharge. If patient is not discharging with hospice services in place, she likely need oxygen set up prior to discharge. Writer updated Nat with Palliative.     Writer left a message with [facility] to discuss patient and plans for discharge    NATHALY Lu

## 2023-09-26 NOTE — PLAN OF CARE
Date&time:09/25/23--5944    Summary:  Pt admitted on 09/22 with increased pain and shortness of breath due to cancer  .   Primary Diagnosis:  Metastatic breast cancer.  Left pleural effusion, increased mets to ribs, chest wall and thoracic spine.     Hx:Thrombocytosis     Orientation: A&OX4    Aggression Stop Light: Green    Mobility: Up ind/SBA with walker in room.     Pain Management: Scheduled Tylenol and Morphine.  PRN IV dilaudid and oxycodone.     Diet: Regular, poor appetite.     Bowel/Bladder: Continent.    Abnormal Lab/Assessments: On 2L NC, dyspnea with activity. WBC-15.2, Hgb-11.5       Drain/Device/Wound: L PIV-SL  .  Consults: Oncology, Palliative, Radiation     D/C Day/Goals/Place: TBD-to home w/ hospice    Shift Note: VSS on 2L O2 via NC, LAKHANI, has infrequent cough w/ mild blood tinged sputum, MD aware. L shoulder and upper back pain managed w/ scheduled and PRN Meds. Pt has constipation, no BM in this shift. Pt has radiation simulation tomorrow in the afternoon, and plan to do outpatient.

## 2023-09-26 NOTE — PROGRESS NOTES
Palliative Care Daily Progress Note  [facility]     Patient Name: Alyson Escobar  Date of Admission: 9/22/2023   Today the patient was seen for: Pain and symptom management, goals of care     Recommendations & Counseling       GOALS OF CARE:  Alyson is pursuing palliative XRT for pain management.  She affirms that she does not want to move forward with cancer directed therapy, following radiation   Alyson is interested in hospice care - will involve unit  for hospice planning.  Typically hospice cannot be initiated until after radiation is complete, but since radiation will be complete within the week (5 fractions to complete by next Monday), will explore if we can get hospice as arranged as possible, even if she discharges to the community before completing radiation and continues radiation as an outpatient    ADVANCE CARE PLANNING:  No health care directive on file. Per  informed consent policy, next of kin should be involved if patient becomes unable.  There is no POLST form on file, defer to patient and/or next of kin for decisions   Code status: No CPR- Do NOT Intubate    MEDICAL MANAGEMENT:  #Malignant pain.  Several sites of pain this AM, including right lateral ribs, left lateral ribs, left shoulder, left mid back -has known sites of malignancy with a left lateral chest wall mass, destructive lesions to the left third and fourth ribs, thoracic spine.  Slept well overnight, did not move at all.  Woke up in discomfort this morning with starting to move around.  Will increase MS Contin 15 mg p.o. to twice daily  Increase Dilaudid 4 to 6 mg p.o. every 4 hours as needed pain.  Dilaudid 0.5 to 1 mg IV every 2 hours as needed severe pain not relieved by oral regimen  Continue APAP 975 mg p.o. every 8 hours  Continue Motrin 600 mg p.o. 3 times daily  Continue Lidoderm patch (2) to painful sites  Voltaren gel topical 4 times daily painful sites  Pursuing palliative XRT, as  above.  Reinforced with Alyson that radiation affects and improvement in pain can occur several weeks after radiation    #Constipation.  Likely multifactorial from opioids, reduced mobility.  No BM in 1 week.  Senna-s 2 tabs p.o. twice daily  MiraLAX twice daily  Bisacodyl suppository x1 this afternoon.  If no results after suppository, fleets enema rectally x1    PSYCHOSOCIAL/SPIRITUAL:  Family -names of niece, Giselle, with whom she lives.  Giselle is not a biological niece, but is a very close friend of Alyson as  daughter.  Alyson lives with Giselle    Palliative Care will continue to follow. Thank you for the consult and allowing us to aid in the care of Alyson Escobar.    These recommendations have been discussed with bedside RN Sondra, Dr. Sheriff, and unit  TOY Martin CNP  Securely message with [employer] (more info)  Text page via [employer] Paging/Directory         Assessments           Alyson Escobar is a 63 year old female with a past medical history of breast cancer s/p bilateral mastectomy who presented on  with uncontrolled pain which was found to be due to her cancer. She was recently in the ER for this pain as well and had a CT which showed multiple rib mets and throacic mets.        Prognosis, Goals, & Planning:   Prognosis, Goals, and/or Advance Care Planning addressed today:   We discussed general treatment options (full/restorative, selective/conservatives, and comfort only/hospice). We then discussed how these specifically apply to Alyson. Based on this discussion, Alyson plans to pursue palliative XRT for pain management.  She is not interested in pursuing cancer directed treatment.  She is interested in enrolling in hospice.    Code Status was addressed today:   No already DNR/DNI    Coping, Meaning, & Spirituality:   Mood, coping, and/or meaning in the context of serious illness were addressed today: Yes         Interval History:     Chart review/discussion with  unit or clinical team members:   Initiated on MS Contin last night.  Slept all night without difficulty.    Per patient or family/caregivers today:  Several sites of pain this morning, after sleeping all night and not taking any pain medication.  Painful sites include right ribs, left ribs, left shoulder, left back.  Still no BM.  Will start radiation today, plan for 5 fractions.         Review of Systems:     Besides above, an additional N/A system ROS was reviewed and is unremarkable          Physical Exam:   Temp:  [97.7  F (36.5  C)-98.3  F (36.8  C)] 98.3  F (36.8  C)  Pulse:  [63-68] 65  Resp:  [16-18] 18  BP: (136-149)/(71-76) 149/76  SpO2:  [95 %-98 %] 95 %  218 lbs 4.8 oz  CONSTITUTIONAL: Chronically ill woman seen sitting up in bed in NAD, A&Ox3 yet fatigued appearing. Calm and cooperative.  HEENT: NCAT, edentulous  NECK: Supple  CARDIOVASCULAR: RRR  RESPIRATORY: NL respiratory effort on nasal cannula 2 L  NEUROLOGIC: Appropriately responsive during interview  PSYCH: Affect engaged           Current Problem List:   Principal Problem:    Chest wall mass  Active Problems:    Pleural effusion, left    Intractable pain      Allergies   Allergen Reactions    Dog Epithelium Allergy Skin Test      Other Reaction(s): respiratory distress    Pollen Extract Other (See Comments)    Penicillin G Swelling and Rash            Medications:   APAP 975 mg p.o. 3 times daily  Bisacodyl suppository x1 today  Voltaren gel topical 4 times daily  Motrin 600 mg p.o. 3 times daily  Lidoderm patch  MS Contin 15 mg p.o. twice daily  PPI  MiraLAX twice daily  Senna 2 tabs p.o. twice daily  Dilaudid 4 to 6 mg p.o. every 4 hours as needed pain  Dilaudid 0.5 to 1 mg IV every 2 hours as needed pain         Data Reviewed:   Recent imaging independently reviewed, my comments on pertinents:   9/24 CT chest with reduced left pleural effusion, left lateral chest wall mass    Recent lab data independently reviewed, my comments on pertinents:    Na 139  K 4.5  Creat 0.71  WBC 15.2  Hgb 11.5  Plt 603    Medical Decision Making   Please see A&P for additional details of medical decision making.  MANAGEMENT DISCUSSED with the following over the past 24 hours: Sondra BRUMFIELD, unit  Dr. Sharita Hassan   NOTE(S)/MEDICAL RECORDS REVIEWED over the past 24 hours: H&P, nursing notes, hospitalist note, palliative note, oncology notes, radiation oncology note  Tests personally interpreted in the past 24 hours:  - CHEST CT showing reduced left pleural effusion, left lateral chest wall mass  Tests REVIEWED in the past 24 hours:  - See lab/imaging results included in the data section of the note  - BMP  - CBC  SUPPLEMENTAL HISTORY, in addition to the patient's history, over the past 24 hours obtained from:   - Sondra BRUMFIELD, unit  Dr. Sharita Hassan  Medical complexity over the past 24 hours:  - Intensive monitoring for MEDICATION TOXICITY  - Decision to DE-ESCALATE CARE based on prognosis

## 2023-09-27 NOTE — PROGRESS NOTES
Radiation Oncology Skin Care Update:    Spoke with patients bedside RN Sondra and let her know it is okay for patient to apply thin layer of topical Voltaren gel in radiation treatment area to help with this patients pain. This may cause a minor increase in skin irritation, but will be tolerated okay and be of benefit to patient.    Please avoid use of heat / ice packs directly to patients skin (can perform cool wash cloth soaks to skin directly). Also do not apply any adhesive patches like lidocaine patch, which can increase skin irritation due to adhesive in the patches.

## 2023-09-27 NOTE — PLAN OF CARE
0700--1530 Nursing shift Note  Stage 4 Breast cancer patient with bone mets to ribs and spine causing pain R&L lateral rib cages, upper L Back , L shoulder and Lupper arm..   Pain controlled with scheduled, Tylenol, MS Contin BID, and prn 4mg dose of oral Dilaudid x1. Abdomen obese soft with +BS. Had BM today, remains on Senokot-S and Miralax. Pt is to receive her radiation treatment dose #2 out of 5 today at 4:45pm . Tolerating diet, up in chair x1 using assist of 1/walker. Unsteady. Fall Risk,   Received follow-up phone call from Radiation therapy RN late this afternoon who clarified that the Voltaren gel is OK to be applied topically to sites of pain even to  the radiation site left chest but in a very thin layer. Gets up with assist of 1/GB/walker. Remains Fall Risk. On reg diet. Appetite poor.   Anticipate discharge home (lives with her niece) with HHC/Oxygen tomorrow pending organizing hospice services to follow. Will need 3 more radiation treatments after today  Continue to monitor

## 2023-09-27 NOTE — PLAN OF CARE
Orientation: A&Ox4    Activity: SBA, pt transfers herself against advise and education. Did call but would still transfer before staff could arrive. Refuses bed alarm     Diet/BS Checks: Regular     Tele: None VSS on 2L O2 NC    IV Access/Drains: LPIV CDI SL    Pain Management: Scheduled morphine. Prn PO dilaudid given, prn IV dilaudid given due to time frame. Pt had relief with 0.5 mg IV dilaudid.     Abnormal VS/Results: Pt had radiation today     Bowel/Bladder: Continent of both. Had Lg BM after suppository     Skin/Wounds: Intact     Consults: HemOnc/palliative/radiation     D/C Disposition: pending, pt would like to go on hospice     Other Info: Hx of breast cancer

## 2023-09-27 NOTE — PLAN OF CARE
Orientation: AO x4.   Activity: Up Ind/SBA with walker in room.   Diet/BS Checks: Regular diet.   Tele:  N/a   IV Access/Drains: LPIV SL, leaking and positional. New PIV placed, CDI.   Pain Management: Scheduled BID MS Contin. PRN IV dilaudid 1 mg x2 - effective.   Abnormal VS/Results: VSS on 2L NC ex HTN.   Bowel/Bladder: Continent of B/B. Up to bathroom.   Skin/Wounds: Dry flaky skin.   Consults: Palliative.   D/C Disposition: Plan to discharge home on hospice after completion of radiation treatment.   Other Info:    5-Fu Counseling: 5-Fluorouracil Counseling:  I discussed with the patient the risks of 5-fluorouracil including but not limited to erythema, scaling, itching, weeping, crusting, and pain.

## 2023-09-27 NOTE — PROGRESS NOTES
[facility]    Medicine Progress Note - Hospitalist Service    Date of Admission:  9/22/2023    Interval History   Patient feels better with her pain under better control.  There has been a request to see if she can get radiation covered with her hospice benefit.  We will hopefully know tomorrow if she could discharge.  She will go well with MS Contin and as needed Dilaudid currently.  Doing better in terms of pain control  Plans to live with her family    Assessment & Plan   Alyson Escobar is a 63 year old female, who recently moved from [place], and was diagnosed with  metastatic breast cancer recently, presents to ER on 9/22/2023 with uncontrolled pain due to cancer.     She was diagnosed with stage III bilateral breast cancer 2 years ago in [place] and was treated with bilateral mastectomies followed by approximately 1 year of chemotherapy.       She presented to ER on 8/7/2023 with chest wall pain.  CT showed lytic destruction of Left fourth rib with associated mass measuring 6.1 x 4.5 x 4.6 cm.  She underwent biopsy on 9/14 that showed this to be metastatic carcinoma consistent with breast primary.  She has not establish care with oncology yet.     CT PE on admission showed - large left pleural effusion which could be complex or malignant.  Significant interval enlargement of left chest wall mass, now measuring 5.3 x 7.3 x 7.3 cm.  There is associated destruction of left third and fourth ribs which has progressed.  There are new lesions within the left ribs fifth and seventh.  There are new lesions in thoracic vertebral bodies at T9, T7.        Metastatic breast cancer with left chest wall mass measuring 5.3 x 7.3 x 7.3 cm, lytic lesions of left third, fourth, fifth and seventh ribs lytic lesions of T9 and T7 vertebral bodies  Large left pleural effusion-most likely malignant.  Infection not ruled out  Uncontrolled pain due to metastatic breast cancer  Leukocytosis of 19  Has been  taking oxycodone 10 mg 4 times a day but pain has remained uncontrolled.  Has leukocytosis of 19 which could secondary to metastatic breast cancer but infection is not completely ruled out.  She is otherwise afebrile and does not have any significant cough or respiratory symptoms.  Large left pleural effusion noted on CT. S/p thoracentesis with removal of 1200 mL of clear serous pleural fluid. Per light's criteria the fluid is exudate. Neg GS/culture.  Fluid pathology/culture (9/23) with fluid  no growth   Oncology consulted.  Radiation onc consulted for palliative radiation.  Palliative service consulted for pain management and GOC.   Ibuprofen to 600 mg 3 times daily  Morphine 2 to 4 mg every 4 hours as needed  Added Tessalon Perles as needed for cough  MS Contin 15 mg po BID   Requiring 4 mg of Dilaudid as needed for breakthrough (she had a couple doses overnight of IV but will try to avoid this so that we can find on her stable oral regimen)      Leukocytosis of 19 on admit.  Thrombocytosis, platelet of 708k  Likely secondary to metastatic cancer  WBC 18-19,000 on admit  9/25 white count 15  No overt sign of infection  Given cancer and no sign of infection we will not continue to further assess     Left hip pain. None this am.   X-ray showed mild degenerative arthritis but no acute fractures or lytic lesions.  L shoulder pain:   could be related to cancer. None this am.  R sided pleuritic chest pain. Possibly due to cancer. Neg CT for PE on admit. And repeated   Pain control..  9/24 CT PE study repeated with no PE.  Decreased size of loculated left effusion, left lateral chest wall mass, lesions within left ribs and thoracic spine again seen  9/27 reported to have diminished breath sounds on the left side again.  We will get a chest x-ray and determine if she could benefit from an additional thoracentesis prior to discharge     Radiation therapy:  Patient seen by radiation oncology.  Underwent CT simulation  "9/25 9/26 started on radiation treatment  Patient will have more treatments this week.  9/27 discussion with case management/social work and patient may be able to still enroll in hospice with a limited number of radiation treatments left.  She is getting approval and if this is the case she may build to leave tomorrow 9/28   Otherwise patient may be leave without enrollment in hospice but she is nervous about having breakthrough pain     Constipation   Started on bowel softeners  Now having bowel movement  Resolved with recent stools    Diet:  Regular diet  DVT Prophylaxis: Pneumatic Compression Devices  Garner Catheter: Not present  Lines: None     Cardiac Monitoring: None  Code Status:  DNR/DNI       Diet: Regular Diet Adult  Snacks/Supplements Adult: Other; 2pm: chocolate Ensure  (RD); Between Meals    DVT Prophylaxis: Pneumatic Compression Devices  Garner Catheter: Not present  Lines: None     Cardiac Monitoring: None  Code Status: No CPR- Do NOT Intubate      Clinically Significant Risk Factors                         # Obesity: Estimated body mass index is 35.23 kg/m  as calculated from the following:    Height as of this encounter: 1.676 m (5' 6\").    Weight as of this encounter: 99 kg (218 lb 4.8 oz).       # Moderate Malnutrition: based on nutrition assessment           Disposition Plan      Expected Discharge Date: 09/28/2023      Destination: home with help/services            BRET LARA MD  Hospitalist Service  [facility]  Securely message with [employer] (more info)  Text page via "[employer] Paging/Directory   ______________________________________________________________________    Physical Exam   Vital Signs: Temp: 97.7  F (36.5  C) Temp src: Oral BP: 136/77 Pulse: 69   Resp: 14 SpO2: 93 % O2 Device: None (Room air) Oxygen Delivery: 2 LPM  Weight: 218 lbs 4.8 oz    General Appearance: Awake and alert  Respiratory: Lungs clear to auscultation bilaterally.  Cardiovascular: Regular " rate and rhythm without gallop or rub  GI: Positive bowel sounds soft rebound guarding tenderness  Skin: no swelling or redness       Medical Decision Making       45 MINUTES SPENT BY ME on the date of service doing chart review, history, exam, documentation & further activities per the note.      Data     I have personally reviewed the following data over the past 24 hrs:    N/A  \   N/A   / N/A     N/A N/A N/A /  N/A   N/A N/A 0.66 \       Imaging results reviewed over the past 24 hrs:   No results found for this or any previous visit (from the past 24 hour(s)).

## 2023-09-27 NOTE — PROGRESS NOTES
Palliative Care Daily Progress Note  [facility]     Patient Name: Alyson Escobar  Date of Admission: 9/22/2023   Today the patient was seen for: Pain and symptom management     Recommendations & Counseling       GOALS OF CARE:  Alyson is pursuing palliative XRT for pain management (5 total fractions, to receive second fraction today).  She affirms that she does not want to move forward with cancer directed therapy, following radiation   Alyson is interested in hospice care -social work exploring if [facility] hospice can be initiated before radiation therapy completes (fifth fraction to be received next Monday 10/2)    ADVANCE CARE PLANNING:  No health care directive on file. Per  informed consent policy, next of kin should be involved if patient becomes unable.  There is no POLST form on file, defer to patient and/or next of kin for decisions   Code status: No CPR- Do NOT Intubate    MEDICAL MANAGEMENT:  #Malignant pain.  Several sites of pain, including right lateral ribs, left lateral ribs, left shoulder, left mid back -has known sites of malignancy with a left lateral chest wall mass, destructive lesions to the left third and fourth ribs, thoracic spine.  Pain is overall better controlled today.  Continue MS Contin 15 mg p.o. twice daily  Continue Dilaudid 4 to 6 mg p.o. every 4 hours as needed pain.  We will reserve Dilaudid 0.5 to 1 mg IV every 6 hours as needed severe pain not relieved by oral regimen  Continue APAP 975 mg p.o. every 8 hours  Continue Motrin 600 mg p.o. 3 times daily  Continue Lidoderm patch (2) to painful sites  Voltaren gel topical 4 times daily painful sites  Pursuing palliative XRT, as above, to be completed on Monday 10/2.  Reinforced with Alyson that radiation effects and improvement in pain can occur several weeks after radiation    #Constipation.  Likely multifactorial from opioids, reduced mobility.  Had large BM yesterday.  Senna-s 2 tabs p.o. twice  daily  MiraLAX twice daily    PSYCHOSOCIAL/SPIRITUAL:  Family -names niece, Giselle, with whom she lives.  Giselle is not a biological niece, but was a very close friend of Alyson's  daughter.  Alyson lives with Giselle    Palliative Care will continue to follow. Thank you for the consult and allowing us to aid in the care of Alyson Escobar.    These recommendations have been discussed with unit  TOY Martin CNP  Securely message with [email or web address] (more info)  Text page via [facility] Paging/Directory         Assessments           Alyson Escobar is a 63 year old female with a past medical history of breast cancer s/p bilateral mastectomy who presented on  with uncontrolled pain which was found to be due to her cancer. She was recently in the ER for this pain as well and had a CT which showed multiple rib mets and throacic mets.        Prognosis, Goals, & Planning:   Prognosis, Goals, and/or Advance Care Planning addressed today:   Alyson is not interested in pursuing cancer directed treatment.  She is interested in enrolling in hospice.    Code Status was addressed today:   No already DNR/DNI    Coping, Meaning, & Spirituality:   Mood, coping, and/or meaning in the context of serious illness were addressed today: Yes         Interval History:     Chart review/discussion with unit or clinical team members:   Pain better controlled on MS Contin twice daily.    Per patient or family/caregivers today:  Awoken from a nap.  Had pain this morning that was easily relieved by Dilaudid.  Pain feels overall better controlled with MS Contin.  Tolerated first radiation treatment yesterday.  Large BM yesterday.         Review of Systems:     Besides above, an additional N/A system ROS was reviewed and is unremarkable          Physical Exam:   Temp:  [97.7  F (36.5  C)-98.1  F (36.7  C)] 97.7  F (36.5  C)  Pulse:  [58-69] 69  Resp:  [14-18] 14  BP: (134-175)/(65-89) 136/77  SpO2:  [93 %-98 %] 93  %  218 lbs 4.8 oz  CONSTITUTIONAL: Chronically ill woman seen sitting up in bed in NAD, A&Ox3 yet fatigued appearing. Calm and cooperative.  HEENT: NCAT, edentulous  NECK: Supple  CARDIOVASCULAR: RRR  RESPIRATORY: NL respiratory effort on room air  NEUROLOGIC: Appropriately responsive during interview  PSYCH: Affect engaged           Current Problem List:   Principal Problem:    Chest wall mass  Active Problems:    Pleural effusion, left    Intractable pain      Allergies   Allergen Reactions    Dog Epithelium Allergy Skin Test      Other Reaction(s): respiratory distress    Pollen Extract Other (See Comments)    Penicillin G Swelling and Rash            Medications:   APAP 975 mg p.o. 3 times daily  Voltaren gel topical 4 times daily  Motrin 600 mg p.o. 3 times daily  Lidoderm patch  MS Contin 15 mg p.o. twice daily  PPI  MiraLAX twice daily  Senna 2 tabs p.o. twice daily  Dilaudid 4 to 6 mg p.o. every 4 hours as needed pain  Dilaudid 0.5 to 1 mg IV every 6 hours as needed pain         Data Reviewed:   Recent imaging independently reviewed, my comments on pertinents:   9/24 CT chest with reduced left pleural effusion, left lateral chest wall mass    Recent lab data independently reviewed, my comments on pertinents:   Creat 0.66    Medical Decision Making   Please see A&P for additional details of medical decision making.  MANAGEMENT DISCUSSED with the following over the past 24 hours: unit  Amy   NOTE(S)/MEDICAL RECORDS REVIEWED over the past 24 hours: nursing notes, hospitalist note, oncology note, radiation oncology note  Tests personally interpreted in the past 24 hours:  - CHEST CT showing reduced left pleural effusion, left lateral chest wall mass  Tests REVIEWED in the past 24 hours:  - See lab/imaging results included in the data section of the note  - BMP  - CBC  SUPPLEMENTAL HISTORY, in addition to the patient's history, over the past 24 hours obtained from:   - unit  Amy  Medical  complexity over the past 24 hours:  - Parenteral (IV) CONTROLLED SUBSTANCES ordered  - Intensive monitoring for MEDICATION TOXICITY

## 2023-09-27 NOTE — PROGRESS NOTES
Oncology Sign-Off Note:  - Patient with plan for hospice upon discharge, she is not interested in any medical oncology directed treatment  - I am hopeful radiation with give great palliation of pain for her  - We will sign-off, please reconsult with any questions or concerns or if patient would like to re-establish with medical oncology  - Appreciate hospitalist, CM, and palliative assistance with her care and transition to hospice  Tiki Candelario PA-C (cell: [phone])

## 2023-09-27 NOTE — PROGRESS NOTES
"Care Management Follow Up    Length of Stay (days): 5    Expected Discharge Date: 09/28/2023     Concerns to be Addressed: discharge planning     Patient plan of care discussed at interdisciplinary rounds: Yes    Anticipated Discharge Disposition: Hospice     Anticipated Discharge Services: None  Anticipated Discharge DME: Oxygen, Walker    Patient/family educated on Medicare website which has current facility and service quality ratings: yes  Education Provided on the Discharge Plan: Yes  Patient/Family in Agreement with the Plan: yes    Referrals Placed by CM/SW: Hospice  Private pay costs discussed: Not applicable    Additional Information:  Writer spoke with Jazmin at [facility]. Jazmin states that if the radiation is palliative and has already been started they can potentially accept while they still are completing the palliative radiation. Jazmin will talk with the  to have them review and get back to writer.     Addendum 1530: Writer talked with Jazmin from [facility] who states that they can admit the patient on Friday and she can finish her radiation. Jazmin will come out tomorrow to talk with the patient. Writer updated the patient who was excited but is not sure why we would discharge on Friday when she has 1 radiation treatment left. Patient states \"I am already here, what's the difference of me going home?\" Writer explained that the difference would be her going home to her own bed and getting settled there with hospice vs being in the hospital. Patient understood but would like to talk with the doctor about the possibility of just staying here until her radiation is complete on Monday. CONCHITA sent message to provider to update.      NATHALY Lu      "

## 2023-09-27 NOTE — PROGRESS NOTES
Radiation therapy treatment #2 of 5. Pt received a daily dose of 400 cGy with 10 MV photon radiation to the left chest wall for a total dose of 800 cGy. Plan for 3 more radiation treatments.

## 2023-09-28 NOTE — PROGRESS NOTES
Care Management Follow Up    Length of Stay (days): 6    Expected Discharge Date: 09/29/2023     Concerns to be Addressed: discharge planning     Patient plan of care discussed at interdisciplinary rounds: Yes    Anticipated Discharge Disposition: Hospice     Anticipated Discharge Services: None  Anticipated Discharge DME: Oxygen, Walker    Patient/family educated on Medicare website which has current facility and service quality ratings: yes  Education Provided on the Discharge Plan: Yes  Patient/Family in Agreement with the Plan: yes    Referrals Placed by CM/SW: Hospice  Private pay costs discussed: Not applicable    Additional Information:  Writer talked with Jazmin at [facility]. They can admit patient at home tomorrow at 5pm. Patient will need the Plurex box sent home with her and they will order what is needed after that. Patient will need a 3-day supply of medications sent with at discharge. Writer let MD know plans for discharge.     NATHALY Lu

## 2023-09-28 NOTE — PRE-PROCEDURE
GENERAL PRE-PROCEDURE:   Procedure:  Left tunneled pleural drain placement with moderate sedation  Date/Time:  9/28/2023 2:07 PM    Written consent obtained?: Yes    Risks and benefits: Risks, benefits and alternatives were discussed    Consent given by:  Patient  Patient states understanding of procedure being performed: Yes    Patient's understanding of procedure matches consent: Yes    Procedure consent matches procedure scheduled: Yes    Expected level of sedation:  Moderate  Appropriately NPO:  Yes  ASA Class:  3  Mallampati  :  Grade 3- soft palate visible, posterior pharyngeal wall not visible  Lungs:  Lungs clear with good breath sounds bilaterally  Heart:  Normal heart sounds and rate  History & Physical reviewed:  History and physical reviewed and no updates needed  Statement of review:  I have reviewed the lab findings, diagnostic data, medications, and the plan for sedation

## 2023-09-28 NOTE — PROGRESS NOTES
Radiation treatment # 3 to Left Cx wall.  1200 cGy total dose to date delivered with 10 MV Photons.  Patient has 2 treatments remaining.  She was given an outpatient schedule in case of discharge.   SE  RTT

## 2023-09-28 NOTE — PLAN OF CARE
Goal Outcome Evaluation:    Date/time:09/27/[id number]    Summary:  Pt admitted on 09/22 with increased pain and shortness of breath due to cancer  Primary Diagnosis:  Metastatic breast cancer.  Left pleural effusion, increased mets to ribs, chest wall and thoracic spine.   Hx:Thrombocytosis   Orientation: A&OX4  Aggression Stop Light: Green  Mobility: Up ind/SBA with walker in room.   Pain Management: Stage 4 Breast cancer patient with bone mets to ribs and spine causing pain R&L lateral rib cages, upper L Back , L shoulder and Lupper arm.  Scheduled Tylenol and Morphine, PRN dilaudid x1   Diet: Regular   Bowel/Bladder: Continent.  Abnormal Lab/Assessments: VS on RA, dyspnea with activity.  Drain/Device/Wound: L PIV-SL  Consults: Oncology, Palliative, Radiation. Pulmonology consult   D/C Day/Goals/Place: TBD-to home w/ hospice  Other info; 2nd radiation completed this evening. 3 more left. XR Chest also completed  This evening ( Impression: Heart normal size, moderate left effusion)

## 2023-09-28 NOTE — PLAN OF CARE
Goal Outcome Evaluation:         Orientation: A&Ox4  Activity: SBA, pt refused bed alarms, Independent in room  Diet/BS Checks: Regular  Tele: N/A  IV Access/Drains: L PIV SL  Pain Management: Scheduled MS Contin, Advil, Tylenol; PRN oral Dilaudid x2  Abnormal VS/Results: VSS on 2L  Bowel/Bladder: Continent of B/B  Skin/Wounds: Edema to BLE  Consults: Palliative, Pulmonology, Radiation Oncology  D/C Disposition: Discharge home on hospice, 1-2 days  Other Info: L Pleural Drain placed this shift for palliative relief; Radiation treatments #3 of 5 done today

## 2023-09-28 NOTE — IR NOTE
Patient Name: Alyson Escobar  Medical Record Number: [id number]  Today's Date: 9/28/2023    Procedure: Left Pleurex catheter placement  Proceduralist: Dr. Puente  Pathology present: no    Procedure Start: 1446  Procedure end: 1504  Sedation medications administered: Last Dose: 1454, Fentanyl 100 mcg, Versed 2 mg, Lidocaine 14 ml's, cleocin 900 mg, Zofran 4 mg.    Report given to: Lance Giron RN  : no    Other Notes: Pt will require 1 bedrest post procedure. Pt arrived to IR room 2 from 821. Consent reviewed. Pt denies any questions or concerns regarding procedure. Pt positioned supine and monitored per protocol. Pt tolerated procedure without any noted complications.

## 2023-09-28 NOTE — CONSULTS
Interventional Radiology - Pre-Procedure Note:  9/28/2023    Procedure Requested: Left pleurx catheter placement  Requested by: Nathalia Sheriff MD      Brief HPI: Alyson Escobar is a 63 year old female with a past medical history significant for 63-year-old female with diagnosis of bilateral metastatic breast cancer S/P bilateral mastectomies, and chronic back pain who was admitted on 9/22 with chest pain. She underwent thoracentesis on 9/23. Cytology for the pleural fluid has been negative and it was exudative by lights clear. She states that she felt relief in her shortness of breath after the procedure. CT chest done after thoracentesis showed lung expansion with remnant left-sided pleural effusion. She continues to have palliative radiation therapy and eventually decided to go home with hospice and declined medical oncology treatment at this time. Pulmonary was consulted for opinion about her candidacy for an indwelling pleural catheter for symptomatic management and have recommended left pleurx catheter placement. IR was consulted for placement.       IMAGING:  EXAM: XR CHEST 2 VIEWS  LOCATION: [facility]  DATE: 9/27/2023     INDICATION: follow up pleural effusion  COMPARISON: 8/8/2023                                                                      IMPRESSION: Heart is normal in size. Moderate left effusion. Pleural-based mass arising off the lateral left upper hemithorax measuring approximately 8.6 x 5 cm which is causing bony destruction of the fourth rib. Right lung clear.    NPO: NPO since 9am  ANTICOAGULANTS: None  ANTIBIOTICS: Clindamycin for procedure    ALLERGIES  Allergies   Allergen Reactions    Dog Epithelium Allergy Skin Test      Other Reaction(s): respiratory distress    Pollen Extract Other (See Comments)    Penicillin G Swelling and Rash     LABS:  No results found for: INR   Hemoglobin   Date Value Ref Range Status   09/25/2023 11.5 (L) 11.7 - 15.7 g/dL  "Final   ]  Platelet Count   Date Value Ref Range Status   09/25/2023 603 (H) 150 - 450 10e3/uL Final     Creatinine   Date Value Ref Range Status   09/27/2023 0.66 0.51 - 0.95 mg/dL Final     Potassium   Date Value Ref Range Status   09/25/2023 4.5 3.4 - 5.3 mmol/L Final     EXAM:  BP (!) 152/82 (BP Location: Right arm)   Pulse 65   Temp 97.8  F (36.6  C) (Axillary)   Resp 16   Ht 1.676 m (5' 6\")   Wt 99 kg (218 lb 4.8 oz)   SpO2 97%   BMI 35.23 kg/m    General:  Stable.  In no acute distress.    Neuro:  A&O x 3. Moves all extremities equally.  Resp:  Lungs distant to auscultation bilaterally, breathing unlabored on O2 via nasal cannula  Cardio:  S1S2 and reg, without murmur, clicks or rubs  Abdomen:  Soft  Vascular:  +2/4 radial pulse        ASSESSMENT/PLAN:   63 year old female with a past medical history significant for 63-year-old female with diagnosis of bilateral metastatic breast cancer S/P bilateral mastectomies, and chronic back pain discharging soon on hospice  -Left tunneled pleural catheter placement with moderate sedation    Procedure, risks/benefits, details, alternatives, and sedation education reviewed with patient, who verbalized understanding. All questions answered.  Total time: 50 minutes    Kerry Carballo PA-C  Interventional Radiology  [facility] PA desk phone *[phone]    "

## 2023-09-28 NOTE — PROGRESS NOTES
Chart reviewed  Note plan to discharge home with hospice  Pt still with 3 palliative radiation treatments left  Diet: Regular + Ensure at 2pm  Appetite fair - ordering 2 meals/day  Breakfast today - pizza, soda, fruit cup  Will continue to send supplement once daily    Priyanka Lemons RD, LD  Clinical Dietitian - [facility]   Pager - [phone]

## 2023-09-28 NOTE — IR NOTE
RADIOLOGY POST PROCEDURE NOTE    Patient name: Alyson Escobar  MRN: [id number]  : 1960    Pre-procedure diagnosis: Recurrent left pleural effusion  Post-procedure diagnosis: Same    Procedure Date/Time: 2023  5:34 PM  Procedure: US and Fluoro guided placement of left Pleural plerX catheter.  500 ml of clear yesy fluid removed.  Ready for use on a PRN basis.  NO apparent complication.  Estimated blood loss: 10 ml  Specimen(s) collected with description: Left pleural fluid    The patient tolerated the procedure well with no immediate complications.  Significant findings:  Please see above.    See imaging dictation for procedural details.    Provider name: Dat Puente MD  Assistant(s):None

## 2023-09-28 NOTE — CONSULTS
[facility]    Pulmonology Consultation     Date of Admission:  9/22/2023  Date of Consult (When I saw the patient): 09/28/23    Assessment & Plan   Alyson Escobar is a 63 year old female who was admitted on 9/22/2023. I was asked to see the patient for evaluation of placing indwelling pleural catheter.    Assessment:  1.  Large left pleural effusion, s/p thoracentesis  Most likely malignant pleural effusion from the metastatic breast cancer.  2.  Metastatic breast cancer with left chest wall mass and lytic lesions in the ribs and vertebral bodies.  3.  Cancer pain due to metastatic breast cancer.  4.  Left hip and shoulder pain    Recommendations:  - According to the patient, she felt symptomatic improvement with her first thoracentesis with lung expansion seen on the CT chest.  - Considering return of the left-sided pleural effusion on the chest x-ray done chest 4 days postthoracentesis, most likely she will have return of her symptoms with reaccumulation.  - This would make her a candidate for an indwelling pleural catheter with self drainage for symptom control at home.  - I discussed this with the patient along with Dr. Sheriff.  We discussed the procedure of placement of catheter itself along with the associated risks and methods of its usage long-term.  - She agreed to have the catheter placed for palliative symptom control which can be placed by interventional radiology.  - Rest of the plan as per the primary team.    Text page  Securely message with the Vocera Web Console (learn more here)    Code Status    No CPR- Do NOT Intubate    Reason for Consult   Reason for consult: Malignant pleural effusion, ? indwelling pleural catheter    Primary Care Physician   Adriana Gonzalez    Chief Complaint   Shortness of breath and cough    History is obtained from the patient    History of Present Illness     Ms. Escobar is a 63-year-old female with a recent diagnosis of metastatic breast  cancer and chronic back pain who was admitted on 9/22 with chest pain.    She recently moved to [place] from [place].  She was diagnosed with bilateral breast cancer 2 years ago in [place].  Treated with bilateral mastectomies followed by chemotherapy.  She was found to have a left chest wall mass, underwent biopsy which showed to be metastatic carcinoma consistent with breast primary.  She was initially admitted for pain control.  CT chest done here showed enlargement of the left chest wall mass and a large left pleural effusion.  She underwent thoracentesis on 9/23.  Cytology for the pleural fluid has been negative and it was exudative by lights clear.  She states that she felt relief in her shortness of breath after the procedure.  CT chest done after thoracentesis showed lung expansion with remnant left-sided pleural effusion.  She continues to have palliative radiation therapy and eventually decided to go home with hospice and declined medical oncology call treatment at this time.  Pulmonary was consulted for opinion about her candidacy for an indwelling pleural catheter for symptomatic management.    Past Medical History   I have reviewed this patient's medical history and updated it with pertinent information if needed.   No past medical history on file.    Past Surgical History   I have reviewed this patient's surgical history and updated it with pertinent information if needed.  No past surgical history on file.    Prior to Admission Medications   Prior to Admission Medications   Prescriptions Last Dose Informant Patient Reported? Taking?   ibuprofen (ADVIL/MOTRIN) 200 MG tablet prn at prn  Yes Yes   Sig: Take 400 mg by mouth 3 times daily as needed for pain . If pain not relieved with oxycodone.   oxyCODONE IR (ROXICODONE) 10 MG tablet 9/22/2023 at x1  Yes Yes   Sig: Take 5 mg by mouth 3 times daily   zolpidem (AMBIEN) 10 MG tablet not yet started at not yet started  Yes No   Sig: Take 10 mg by mouth nightly  as needed for sleep      Facility-Administered Medications: None     Allergies   Allergies   Allergen Reactions    Dog Epithelium Allergy Skin Test      Other Reaction(s): respiratory distress    Pollen Extract Other (See Comments)    Penicillin G Swelling and Rash       Social History   I have reviewed this patient's social history and updated it with pertinent information if needed. Alyson Escobar      Family History   I have reviewed this patient's family history and updated it with pertinent information if needed.   No family history on file.    Review of Systems   The 10 point Review of Systems is negative other than noted in the HPI or here.     Physical Exam   Temp: 97.8  F (36.6  C) Temp src: Axillary BP: (!) 152/82 Pulse: 65   Resp: 16 SpO2: 97 % O2 Device: Nasal cannula Oxygen Delivery: 2 LPM  Vital Signs with Ranges  Temp:  [97.8  F (36.6  C)-98.1  F (36.7  C)] 97.8  F (36.6  C)  Pulse:  [65-85] 65  Resp:  [12-16] 16  BP: (151-153)/(82-83) 152/82  SpO2:  [92 %-97 %] 97 %  218 lbs 4.8 oz    General Appearance:  Awake and alert  Respiratory: Marked limited breath sounds on the left side.  Crackles heard on the right side  Cardiovascular: Regular rate and rhythm without gallop or rub  GI: Positive bowel sounds soft rebound guarding tenderness  Skin: no swelling or redness        No data to display                All cultures:  No results for input(s): CULT in the last 168 hours.    Imaging:          Data   Results for orders placed or performed during the hospital encounter of 09/22/23 (from the past 24 hour(s))   XR Chest 2 Views    Narrative    EXAM: XR CHEST 2 VIEWS  LOCATION: [facility]  DATE: 9/27/2023    INDICATION: follow up pleural effusion  COMPARISON: 8/8/2023      Impression    IMPRESSION: Heart is normal in size. Moderate left effusion. Pleural-based mass arising off the lateral left upper hemithorax measuring approximately 8.6 x 5 cm which is causing bony  destruction of the fourth rib. Right lung clear.       I personally spent 50 minutes on the date of the encounter doing chart review, history and exam, documentation and further activities per the note.       Pulmonary will sign off.  Please feel free to reconsult if her clinical condition changes.      CARLOS Ovalles   of Medicine  [facility]  Pulmonary, Allergy, Critical Care and Sleep Medicine  Pager - [phone]

## 2023-09-28 NOTE — PLAN OF CARE
Orientation: A&O x4.   Activity: Up Ind/SBA w walker in room, pt refuses bed alarm  Diet/BS Checks: Regular diet.   Tele:  N/a   IV Access/Drains: PIV SL  Pain Management: C/o pain, managed with PRN oral dilaudid x1 and scheduled tylenol   Abnormal VS/Results: VSS on 2L NC ex HTN.   Bowel/Bladder: Continent of B/B. Pt ambulating to bathroom and voiding adequately   Skin/Wounds: Dry flaky skin with bruising  Consults: Palliative, radiation   D/C Disposition: Plan to possibly discharge home on hospice on Friday 9/29 vs Monday 10/2 per  note   Other Info:   2/5 radiation treatment completed, Plan to complete 3 more radiation treatments.

## 2023-09-28 NOTE — PROGRESS NOTES
[facility]    Medicine Progress Note - Hospitalist Service    Date of Admission:  9/22/2023    Interval History   Patient seen with pulmonary at the bedside.  Discussion regarding recurrence of fluid in her left lung.  She is going to go to hospice.  Has radiation scheduled for today.  Discussion about risks and benefits of a Pleurx catheter and given the rapid recurrence of fluid this may give her symptomatic benefit.  Discussion regarding how hospice could drain this..   Avoid feeling more short of breath or need for rehospitalization  She is going to live with her family.  Now has a bed available in hospice for tomorrow.    Assessment & Plan   Alyson Escobar is a 63 year old female, who recently moved from [place], and was diagnosed with  metastatic breast cancer recently, presents to ER on 9/22/2023 with uncontrolled pain due to cancer.     She was diagnosed with stage III bilateral breast cancer 2 years ago in [place] and was treated with bilateral mastectomies followed by approximately 1 year of chemotherapy.       She presented to ER on 8/7/2023 with chest wall pain.  CT showed lytic destruction of Left fourth rib with associated mass measuring 6.1 x 4.5 x 4.6 cm.  She underwent biopsy on 9/14 that showed this to be metastatic carcinoma consistent with breast primary.  She has not establish care with oncology yet.     CT PE on admission showed - large left pleural effusion which could be complex or malignant.  Significant interval enlargement of left chest wall mass, now measuring 5.3 x 7.3 x 7.3 cm.  There is associated destruction of left third and fourth ribs which has progressed.  There are new lesions within the left ribs fifth and seventh.  There are new lesions in thoracic vertebral bodies at T9, T7.        Metastatic breast cancer with left chest wall mass measuring 5.3 x 7.3 x 7.3 cm, lytic lesions of left third, fourth, fifth and seventh ribs lytic lesions of T9 and T7  vertebral bodies  Large left pleural effusion-most likely malignant.  No sign of infection  Uncontrolled pain due to metastatic breast cancer  Leukocytosis of 19 >>15>> trend is downgoing initially 19,000 down to 15,000  Has been taking oxycodone 10 mg 4 times a day but pain has remained uncontrolled.  Had leukocytosis of 19 which could secondary to metastatic breast cancer with no overt signs of infection. Afebrile this admission.   Large left pleural effusion has been drained once and now second time 9/28 with Pleurx catheter  Fluid pathology/culture (9/23) with fluid  no growth   Oncology consulted.  Radiation onc consulted for palliative radiation.  Palliative service consulted for pain management and GOC.  Patient decided to complete her current radiation treatment and then is going home to stay with family and enroll in hospice>> has been accepted for hospice with a few radiation treatments remaining  Declines any further treatments including chemotherapy as per discussions with oncology    Pain control:   Palliative care team assistance.   Ibuprofen to 600 mg 3 times daily  Morphine 2 to 4 mg every 4 hours as needed  Added Tessalon Perles as needed for cough  MS Contin 15 mg po BID   9/28 reviewed MAR.  Prior to having Pleurx catheter in received only approximately 3 doses of breakthrough Dilaudid     Leukocytosis of 19 on admit.  Thrombocytosis, platelet of 708k  Likely secondary to metastatic cancer  WBC 18-19,000 on admit  9/25 white count 15  No overt sign of infection  Given cancer and no sign of infection we will not continue to further assess     Large left pleural effusion:  Patient with a large left pleural effusion.  Large left pleural effusion noted on CT.   S/p thoracentesis with removal of 1200 mL of clear serous pleural fluid. Per light's criteria the fluid is exudate. Neg GS/culture.  Fluid pathology/culture (9/23) with fluid  no growth   9/28 chest x-ray obtained on 9/27 showing again  "reaccumulation of fluid in the left pleural space  9/28 requested pulmonary consult for further input regarding possibility of Pleurx catheter.  9/28 IR consult for Pleurx catheter in the left chest she is going to enroll in hospice and this would allow her to have>> better long-term relief of breathing secondary to reaccumulation of fluid  Patient had left Pleurx catheter placed 9/28     Left hip pain. None this am.   X-ray showed mild degenerative arthritis but no acute fractures or lytic lesions.  L shoulder pain:   could be related to cancer.    R sided pleuritic chest pain.   Possibly due to cancer. 9/22 Neg CT for PE on admit. And repeated 9/24 CT PE study negative.   Pain control..     Radiation therapy:  Patient seen by radiation oncology.  Underwent CT simulation 9/25 9/26 started on radiation treatment  Patient will have more treatments this week.  9/27 discussion with case management/social work and patient may be able to still enroll in hospice with a limited number of radiation treatments left.  9/28 radiation today.   Will be able to enroll in hospice with a few radiation treatments. Remaining.      Constipation   Started on bowel softeners  Now having bowel movement  Resolved with recent stools    Diet:  Regular diet  DVT Prophylaxis: Pneumatic Compression Devices  Garner Catheter: Not present  Lines: None     Cardiac Monitoring: None  Code Status:  DNR/DNI       Diet: Snacks/Supplements Adult: Other; 2pm: chocolate Ensure  (RD); Between Meals  Regular Diet Adult    DVT Prophylaxis: Pneumatic Compression Devices  Garner Catheter: Not present  Lines: None     Cardiac Monitoring: None  Code Status: No CPR- Do NOT Intubate      Clinically Significant Risk Factors                         # Obesity: Estimated body mass index is 35.23 kg/m  as calculated from the following:    Height as of this encounter: 1.676 m (5' 6\").    Weight as of this encounter: 99 kg (218 lb 4.8 oz).       # Moderate Malnutrition: " based on nutrition assessment           Disposition Plan      Expected Discharge Date: 09/29/2023      Destination: home with help/services            BRET LARA MD  Hospitalist Service  [facility]  Securely message with Anacomp (more info)  Text page via FilterEasy Paging/Directory   ______________________________________________________________________    Physical Exam   Vital Signs: Temp: 98.4  F (36.9  C) Temp src: Axillary BP: (!) 169/84 Pulse: 74   Resp: 18 SpO2: 98 % O2 Device: Nasal cannula Oxygen Delivery: 2 LPM  Weight: 218 lbs 4.8 oz    General Appearance: Awake and alert  Respiratory: Lungs decreased BS 1/3 base on the left.   Cardiovascular: Regular rate and rhythm without gallop or rub  GI: Positive bowel sounds soft rebound guarding tenderness  Skin: no swelling or redness       Medical Decision Making       55 MINUTES SPENT BY ME on the date of service doing chart review, history, exam, documentation & further activities per the note.    Discussion regarding care with pulmonary at the bedside and Pleurx catheter.  Discussion with case management/ regarding disposition on hospice.    Data         Imaging results reviewed over the past 24 hrs:   Recent Results (from the past 24 hour(s))   XR Chest 2 Views    Narrative    EXAM: XR CHEST 2 VIEWS  LOCATION: [facility]  DATE: 9/27/2023    INDICATION: follow up pleural effusion  COMPARISON: 8/8/2023      Impression    IMPRESSION: Heart is normal in size. Moderate left effusion. Pleural-based mass arising off the lateral left upper hemithorax measuring approximately 8.6 x 5 cm which is causing bony destruction of the fourth rib. Right lung clear.

## 2023-09-29 NOTE — PROGRESS NOTES
[facility]    Pulmonary, Allergy, Critical Care and Sleep Medicine    Follow-up Note  September 29, 2023         Assessment and Plan:   Alyson Escobar is a 63 year old female who was admitted on 9/22/2023. I was asked to see the patient for evaluation of placing indwelling pleural catheter.     Assessment:  1.  Large left pleural effusion, s/p thoracentesis  Most likely malignant pleural effusion from the metastatic breast cancer.  S/p Pleurx catheter placement on 9/28/2023  2.  Metastatic breast cancer with left chest wall mass and lytic lesions in the ribs and vertebral bodies.  3.  Cancer pain due to metastatic breast cancer.  4.  Left hip and shoulder pain     Recommendations:  - Pleurx catheter placed yesterday on 9/28/2023.  - Pleurx drainage instructions as mentioned below, can be done by family member or the hospice team.  - Rest of the plan as per the primary team.    PleurX Drainage Instructions:    Patient instructed to drain every 3-5 days until drained twice at home.     Then, drainage is as needed to control symptoms.    If patient feels as though needs drainage, but no fluid comes out when attempting to drain, catheter may be clogged. This can be rectified with instillation of tPA into PleurX.    If no drainage for several weeks, PleurX could be removed if desired. Prior to removal, would need CXR showing resolution of fluid/pleurodesis. This is a minor procedure with local anesthetic only.     Pulmonary service will sign off at this time.  Please feel free to reconsult if her clinical condition changes.    CARLOS Ovalles   of Medicine  [facility]  Pulmonary, Allergy, Critical Care and Sleep Medicine  Pager - [phone]        Interval History:     - Feels her shortness of breath is slightly better.  No immediate complications after Pleurx placement yesterday.  - Complains of right-sided chest pain, likely secondary to persistent  cough..  - Was on room air with sats above 95% in the morning when I saw her.  - Plan for discharge to hospice later today.           Review of Systems:   C: negative for fever, chills, change in weight  INTEGUMENTARY/SKIN: no rash or obvious new lesions  ENT/MOUTH: no sore throat, new sinus pain or nasal drainage  RESP: see interval history  CV: negative for chest pain, palpitations or peripheral edema  GI: no nausea, vomiting, change in stools  MUSCULOSKELETAL: no myalgias, arthralgias          Medications:      acetaminophen  975 mg Oral Q8H    guaiFENesin  600 mg Oral BID    ibuprofen  600 mg Oral TID    lidocaine  2 patch Transdermal Q24H    morphine  15 mg Oral Q12H ARGENIS (08/20)    pantoprazole  40 mg Oral QAM AC    polyethylene glycol  17 g Oral BID    senna-docusate  2 tablet Oral BID     benzonatate, diclofenac, flumazenil, HYDROmorphone, HYDROmorphone, melatonin, naloxone **OR** naloxone **OR** naloxone **OR** naloxone, ondansetron **OR** ondansetron, sodium phosphate         Physical Exam:   Temp:  [98.1  F (36.7  C)-98.4  F (36.9  C)] 98.4  F (36.9  C)  Pulse:  [65-96] 79  Resp:  [11-18] 16  BP: (121-177)/(77-93) 154/79  SpO2:  [88 %-98 %] 95 %    Intake/Output Summary (Last 24 hours) at 9/29/2023 1026  Last data filed at 9/29/2023 0826  Gross per 24 hour   Intake 240 ml   Output 450 ml   Net -210 ml     Constitutional:   Awake, alert and in no apparent distress     Eyes:   nonicteric     ENT:    oral mucosa moist without lesions     Neck:   Supple without supraclavicular or cervical lymphadenopathy     Lungs:   Good air flow.  No crackles. No rhonchi.  No wheezes.     Cardiovascular:   Normal S1 and S2.  RRR.  No murmur, gallop or rub.     Abdomen:   NABS, soft, nontender, nondistended.  No HSM.     Musculoskeletal:   No edema     Neurologic:   Alert and conversant.     Skin:   Warm, dry.  No rash on limited exam.             Data:   All laboratory and imaging data reviewed.    CMP  Recent Labs   Lab  09/29/23  0739 09/27/23  0819 09/25/23  0852 09/24/23  0733 09/23/23  0705 09/22/23 2011   NA  --   --  139 138 137 140   POTASSIUM  --   --  4.5 4.4 3.7 3.5   CHLORIDE  --   --  100 99 96* 97*   CO2  --   --  31* 28 30* 29   ANIONGAP  --   --  8 11 11 14   GLC  --   --  143* 117* 105* 116*   BUN  --   --  16.1 17.2 14.6 12.2   CR 0.62 0.66 0.71 0.65 0.77 0.69   GFRESTIMATED >90 >90 >90 >90 85 >90   ANDRY  --   --  9.3 9.2 9.5 9.9   PROTTOTAL  --   --   --   --   --  8.1     CBC  Recent Labs   Lab 09/29/23 0739 09/25/23  0852 09/24/23  0733 09/23/23  0705 09/22/23 2011   WBC  --  15.2* 18.3* 18.3* 19.4*   RBC  --  4.00 4.14 4.25 4.74   HGB  --  11.5* 11.8 12.1 13.7   HCT  --  36.8 38.2 39.5 43.0   MCV  --  92 92 93 91   MCH  --  28.8 28.5 28.5 28.9   MCHC  --  31.3* 30.9* 30.6* 31.9   RDW  --  13.2 13.2 13.3 13.2   * 603* 642* 658* 708*     INRNo lab results found in last 7 days.  Arterial Blood GasNo lab results found in last 7 days.  Urine Studies  No lab results found.  CMV viral loads  No lab results found.  No results found for: CMQNT, CMVQ  EBV viral loads   No lab results found.  No results found for: EBVDN, EBRES, EBVDN, EBVSP, EBVPC, EBVPCR  Respiratory Virus Testing    No results found for: RS, FLUAG  Sputum Culture last 3 months:  No results found for: SDES No results found for: CULT     Attestation:    I personally spent 35 minutes on the date of the encounter doing chart review, history and exam, documentation and further activities per the note.     CARLOS Ovalles   of Medicine  [facility]  Pulmonary, Allergy, Critical Care and Sleep Medicine  Pager - [phone]

## 2023-09-29 NOTE — PLAN OF CARE
MD Notification    Notified Person: MD    Notified Person Name: Tk Loja    Notification Date/Time: 9/29/2023 @ 0420    Notification Interaction: Blizuu messaging    Purpose of Notification: 8821- PT requesting something as to complement Tessalon for cough. Cannot take liquid medications.     Orders Received: Mucinex scheduled BID starting @ 0900.    Comments:

## 2023-09-29 NOTE — PROGRESS NOTES
Care Management Discharge Note    Discharge Date: 10/02/2023       Discharge Disposition: Hospice - home  Discharge Services: [facility] hospice  Discharge DME: Oxygen, Walker  Discharge Transportation: collin Desai     Private pay costs discussed: Not applicable    Does the patient's insurance plan have a 3 day qualifying hospital stay waiver?  Yes   Which insurance plan 3 day waiver is available? Alternative insurance waiver  Will the waiver be used for post-acute placement? No    PAS Confirmation Code:  not needed  Patient/family educated on Medicare website which has current facility and service quality ratings: yes    Education Provided on the Discharge Plan: Yes  Persons Notified of Discharge Plans: patient, family, hospice agency   Patient/Family in Agreement with the Plan: yes    Handoff Referral Completed: No    Additional Information:  CONCHITA met with patient who confirmed that her niece would be here today after radiation, which is scheduled for 1500. She asked SW to contact collin to confirm this and ask that she bring her walker.     CONCHITA spoke to collin and she will be at the hospital this evening and will bring patient home at 1700 with hospice services being provided by [facility], with a sign on hospice meeting at 1800 this evening. Giselle is bringing the walker to the hospital. Three days of comfort meds needed. Hospitalist notified for orders.     ADD: 1550  Orders sent. Physical hard copies of controlled medications not able to be found; patient will be sent with three days supply of comfort meds. Called hospice agency to be sure they were aware that patient was on oxygen. They said they will be prepared to have this right away.     Caro Perez, YADIRA, SW   Social Work   [facility]

## 2023-09-29 NOTE — PROGRESS NOTES
I certify that this patient, Alyson Escobar has been under my care (or a nurse practitioner or physican's assistant working with me). This is the face-to-face encounter for oxygen medical necessity.      At the time of this encounter supplemental oxygen is reasonable and necessary and is expected to improve the patient's condition in a home setting.       Patient has continued oxygen desaturation due to  lung cancer  .    If portability is ordered, is the patient mobile within the home? yes

## 2023-09-29 NOTE — PLAN OF CARE
Goal Outcome Evaluation: 1930-0700     A&O x4. SBA, refuses bed alarm, steady gait, independent in room. Regular diet. VSS on 2L NC with humidification. Regular diet. L PIV SL. L pleural drain dressing CDI. Pain mostly above rib cage up into shoulder and neck controlled with scheduled MS Contin, Tylenol, Ibuprofen and PRN PO Dilaudid. Declines ice/heat. Tessalon x2, Mucinex to start this am. Voiding spontaneously. Daily weights. Had 3/5 radiation treatments yesterday.     Plan for discharge home with hospice.

## 2023-09-29 NOTE — PROGRESS NOTES
Radiation treatment # 4 to Lt Cx wall.  1600 cGy total dose to date delivered with 10 MV Photons.  Patient has one treatment remaining.  SE RTT

## 2023-09-29 NOTE — PROVIDER NOTIFICATION
Discharge Note    Patient discharged to home via private vehicle  accompanied by collin .  IV: Discontinued  Prescriptions filled and given to patient/family.   Belongings reviewed and sent with patient.   Home medications returned to patient: NA  Equipment sent with: patient.   patient verbalizes understanding of discharge instructions. AVS given to patient.    Writer spoke with [facility], will go over pleurx teaching with patient.

## (undated) RX ORDER — LIDOCAINE HYDROCHLORIDE 10 MG/ML
INJECTION, SOLUTION INFILTRATION; PERINEURAL
Status: DISPENSED
Start: 2023-01-01

## (undated) RX ORDER — ONDANSETRON 2 MG/ML
INJECTION INTRAMUSCULAR; INTRAVENOUS
Status: DISPENSED
Start: 2023-01-01

## (undated) RX ORDER — FENTANYL CITRATE 50 UG/ML
INJECTION, SOLUTION INTRAMUSCULAR; INTRAVENOUS
Status: DISPENSED
Start: 2023-01-01